# Patient Record
Sex: FEMALE | Employment: OTHER | URBAN - METROPOLITAN AREA
[De-identification: names, ages, dates, MRNs, and addresses within clinical notes are randomized per-mention and may not be internally consistent; named-entity substitution may affect disease eponyms.]

---

## 2017-05-22 ENCOUNTER — TRANSFERRED RECORDS (OUTPATIENT)
Dept: HEALTH INFORMATION MANAGEMENT | Facility: CLINIC | Age: 67
End: 2017-05-22

## 2017-08-15 ENCOUNTER — TELEPHONE (OUTPATIENT)
Dept: GASTROENTEROLOGY | Facility: CLINIC | Age: 67
End: 2017-08-15

## 2017-08-15 NOTE — TELEPHONE ENCOUNTER
Disc taken to be uploaded (CT abd pel)  Final read scanned into [facility].      08/15/2017  318p

## 2017-09-11 ENCOUNTER — TELEPHONE (OUTPATIENT)
Dept: GASTROENTEROLOGY | Facility: CLINIC | Age: 67
End: 2017-09-11

## 2017-09-11 NOTE — TELEPHONE ENCOUNTER
The call center is sending messages to call this patient with updates on her referral.    I called this patient and I don't believe her referral is to our clinic.   A CT was sent to our clinic and I did take it to be scanned in but this patient is looking to have excess skin removed and also she needs a hernia procedure.   I asked her to contact her PCP and have them initiate a referral if we are indeed the right clinic.    SR 09/11/2017  244p

## 2017-09-26 ENCOUNTER — CARE COORDINATION (OUTPATIENT)
Dept: SURGERY | Facility: CLINIC | Age: 67
End: 2017-09-26

## 2017-09-26 NOTE — PROGRESS NOTES
Pre Visit Call and Assessment    Date of call:  09/26/2017    Phone numbers:  Home number on file [phone] (home)    Reached patient/confirmed appointment:  Yes  Patient care team/Primary provider:  Rafia Vogel  Preferred outpatient pharmacy:  No Pharmacies Listed  Referred to:  Dr. Jayy Bell    Reason for visit:  Hernia consult

## 2017-11-09 ENCOUNTER — TELEPHONE (OUTPATIENT)
Dept: SURGERY | Facility: CLINIC | Age: 67
End: 2017-11-09

## 2017-11-09 NOTE — TELEPHONE ENCOUNTER
Per task, I called Simin to reschedule her appointment. I confirmed time, date, location and provider with her. I also offered to reschedule her plastic surgery appointment and she declined. She feels that she needs to see Dr. Chaves more than she needs to see Dr. Bell. She did not want my direct number since I took care of everything for her.

## 2017-11-09 NOTE — TELEPHONE ENCOUNTER
----- Message from Azeb Lr RN sent at 11/9/2017 10:41 AM CST -----  Regarding: [facility] patient  Dr. Ray Hernandez will not be in clinic this day.  Can you reschedule the patients appointment.    1/3/18     Azeb

## 2018-02-20 ENCOUNTER — TELEPHONE (OUTPATIENT)
Dept: SURGERY | Facility: CLINIC | Age: 68
End: 2018-02-20

## 2018-02-20 NOTE — TELEPHONE ENCOUNTER
Pre Visit Call and Assessment    Date of call:  02/20/2018    Phone numbers:  Home number on file [phone] (home)    Reached patient/confirmed appointment:  Yes  Patient care team/Primary provider:  Rafia Vogel  Preferred outpatient pharmacy:  No Pharmacies Listed  Referred to:  Dr. Jayy Bell    Reason for visit: New hernia consult  Other: Confirmed plastic surgery consult appointment with patient.

## 2018-02-21 ENCOUNTER — OFFICE VISIT (OUTPATIENT)
Dept: PLASTIC SURGERY | Facility: CLINIC | Age: 68
End: 2018-02-21
Payer: MEDICARE

## 2018-02-21 ENCOUNTER — OFFICE VISIT (OUTPATIENT)
Dept: SURGERY | Facility: CLINIC | Age: 68
End: 2018-02-21
Payer: MEDICARE

## 2018-02-21 VITALS
DIASTOLIC BLOOD PRESSURE: 96 MMHG | TEMPERATURE: 98.3 F | WEIGHT: 277.7 LBS | OXYGEN SATURATION: 96 % | SYSTOLIC BLOOD PRESSURE: 215 MMHG | HEART RATE: 83 BPM | HEIGHT: 55 IN | BODY MASS INDEX: 64.27 KG/M2

## 2018-02-21 DIAGNOSIS — K43.9 VENTRAL HERNIA WITHOUT OBSTRUCTION OR GANGRENE: Primary | ICD-10-CM

## 2018-02-21 DIAGNOSIS — M79.3 PANNICULITIS: Primary | ICD-10-CM

## 2018-02-21 RX ORDER — HYDRALAZINE HYDROCHLORIDE 50 MG/1
50 TABLET, FILM COATED ORAL 2 TIMES DAILY
Status: ON HOLD | COMMUNITY
End: 2018-09-28

## 2018-02-21 RX ORDER — PNV NO.95/FERROUS FUM/FOLIC AC 28MG-0.8MG
325 TABLET ORAL
Status: ON HOLD | COMMUNITY
End: 2018-09-28

## 2018-02-21 RX ORDER — GLIPIZIDE 10 MG/1
10 TABLET, FILM COATED, EXTENDED RELEASE ORAL
Status: ON HOLD | COMMUNITY
End: 2018-09-28

## 2018-02-21 RX ORDER — SIMVASTATIN 80 MG
80 TABLET ORAL AT BEDTIME
Status: ON HOLD | COMMUNITY
End: 2018-09-28

## 2018-02-21 RX ORDER — WARFARIN SODIUM 2.5 MG/1
2.5 TABLET ORAL
COMMUNITY
End: 2018-09-11

## 2018-02-21 RX ORDER — LORATADINE 10 MG/1
10 TABLET ORAL DAILY
Status: ON HOLD | COMMUNITY
End: 2018-09-28

## 2018-02-21 RX ORDER — CARVEDILOL 12.5 MG/1
12.5 TABLET ORAL 2 TIMES DAILY WITH MEALS
Status: ON HOLD | COMMUNITY
End: 2018-09-28

## 2018-02-21 RX ORDER — ACETAMINOPHEN 500 MG
500 TABLET ORAL
COMMUNITY
Start: 2017-04-12 | End: 2018-09-11

## 2018-02-21 RX ORDER — PIOGLITAZONEHYDROCHLORIDE 45 MG/1
45 TABLET ORAL DAILY
Status: ON HOLD | COMMUNITY
End: 2018-09-28

## 2018-02-21 RX ORDER — ISOSORBIDE MONONITRATE 30 MG/1
30 TABLET, EXTENDED RELEASE ORAL DAILY
Status: ON HOLD | COMMUNITY
End: 2018-09-28

## 2018-02-21 RX ORDER — FUROSEMIDE 20 MG
20 TABLET ORAL DAILY
Status: ON HOLD | COMMUNITY
End: 2018-09-28

## 2018-02-21 ASSESSMENT — PAIN SCALES - GENERAL: PAINLEVEL: MILD PAIN (3)

## 2018-02-21 NOTE — NURSING NOTE
Chief Complaint   Patient presents with     Clinic Care Coordination - Initial     New pt consult, hernia.        Vitals:    02/21/18 0928   BP: (!) 215/96   BP Location: Left arm   Patient Position: Chair   Cuff Size: Adult Large   Pulse: 83   Temp: 98.3  F (36.8  C)   TempSrc: Oral   SpO2: 96%   Weight: 277 lb 11.2 oz   Height: 4'       Body mass index is 84.74 kg/(m^2).  Renaldo ASHRAF LPN

## 2018-02-21 NOTE — PATIENT INSTRUCTIONS
1.  Contact primary doctor to get referral to cardiac and whatever other work-up that may be needed for clearance for surgery.  We will need written clearance faxed to Dr. Chaves at [phone].    2.  Speak to brother about risk of death, heart attack, stroke and delayed wound problems for 6 months to a year.  You will need wound care, most likely in a nursing home for several months.    3.  Contact our office once you have received your clearance from a cardiologist in addition to whomever Dr. Vogel feels you need to see for surgical clearance.  Jenelle RN, phone [phone].

## 2018-02-21 NOTE — PATIENT INSTRUCTIONS
Return to the Surgery Clinic on an as needed basis.  Please call [phone], option #3, with questions.

## 2018-02-21 NOTE — MR AVS SNAPSHOT
After Visit Summary   2018    Simin Huddleston    MRN: [id number]           Patient Information     Date Of Birth          1950        Visit Information        Provider Department      2018 10:00 AM AGUSTO Chaves MD [facility] Plastic and Reconstructive Surgery        Care Instructions    1.  Contact primary doctor to get referral to cardiac and whatever other work-up that may be needed for clearance for surgery.  We will need written clearance faxed to Dr. Chaves at [phone].    2.  Speak to brother about risk of death, heart attack, stroke and delayed wound problems for 6 months to a year.  You will need wound care, most likely in a nursing home for several months.    3.  Contact our office once you have received your clearance from a cardiologist in addition to whomever Dr. Vogel feels you need to see for surgical clearance.  ESTEPHANIA Almanzar, phone [phone].          Follow-ups after your visit        Who to contact     Please call your clinic at [phone] to:    Ask questions about your health    Make or cancel appointments    Discuss your medicines    Learn about your test results    Speak to your doctor            Additional Information About Your Visit        MyChart Information     ClearDATAt is an electronic gateway that provides easy, online access to your medical records. With [employer], you can request a clinic appointment, read your test results, renew a prescription or communicate with your care team.     To sign up for Clear[employer]At visit the website at [email or web address]   You will be asked to enter the access code listed below, as well as some personal information. Please follow the directions to create your username and password.     Your access code is: [email or web address]  Expires: 3/20/2018  6:30 AM     Your access code will  in 90 days. If you need help or a new code, please contact your [facility] or call [phone] for  assistance.        Care EveryWhere ID     This is your Care EveryWhere ID. This could be used by other organizations to access your [place] medical records  [id number]         Blood Pressure from Last 3 Encounters:   02/21/18 (!) 215/96    Weight from Last 3 Encounters:   02/21/18 126 kg (277 lb 11.2 oz)              Today, you had the following     No orders found for display       Primary Care Provider Office Phone # Fax #    Rafia Vogel [phone]       [address] [address]        Equal Access to Services     [facility]: Hadii aad ku hadasho Soomaali, waaxda luqadaha, qaybta kaalmada adeegyada, donato patton . So [facility] [phone].    ATENCIÓN: Si habla español, tiene a romano disposición servicios gratuitos de asistencia lingüística. [facility] [phone].    We comply with applicable federal civil rights laws and [place] laws. We do not discriminate on the basis of race, color, national origin, age, disability, sex, sexual orientation, or gender identity.            Thank you!     Thank you for choosing [facility] PLASTIC AND RECONSTRUCTIVE SURGERY  for your care. Our goal is always to provide you with excellent care. Hearing back from our patients is one way we can continue to improve our services. Please take a few minutes to complete the written survey that you may receive in the mail after your visit with us. Thank you!             Your Updated Medication List - Protect others around you: Learn how to safely use, store and throw away your medicines at www.disposemymeds.org.          This list is accurate as of 2/21/18 10:29 AM.  Always use your most recent med list.                   Brand Name Dispense Instructions for use Diagnosis    acetaminophen 500 MG tablet    TYLENOL     Take 500 mg by mouth        aspirin 81 MG tablet      Take 81 mg by mouth        carvedilol 12.5 MG tablet    COREG     Take 12.5 mg by mouth         furosemide 20 MG tablet    LASIX     Take 20 mg by mouth        glipiZIDE 10 MG 24 hr tablet    GLUCOTROL XL     Take 10 mg by mouth        hydrALAZINE 50 MG tablet    APRESOLINE     Take 50 mg by mouth        iron 325 (65 FE) MG tablet      Take 325 mg by mouth        isosorbide mononitrate 30 MG 24 hr tablet    IMDUR     Take 30 mg by mouth        loratadine 10 MG tablet    CLARITIN     Take 10 mg by mouth daily        pioglitazone 45 MG tablet    ACTOS     Take 45 mg by mouth        simvastatin 80 MG tablet    ZOCOR     Take 80 mg by mouth        warfarin 2.5 MG tablet    COUMADIN     Take 2.5 mg by mouth

## 2018-02-21 NOTE — LETTER
2/21/2018       RE: Simin Huddleston  [address]    [facility] [address]     Dear Colleague,    Thank you for referring your patient, Simin Huddleston, to the [facility] PLASTIC AND RECONSTRUCTIVE SURGERY at [facility]. Please see a copy of my visit note below.    REFERRING PHYSICIAN:  Rafia Vogel MD      PRESENTING COMPLAINT:  Consultation for massive abdominal panniculus.      HISTORY OF PRESENT ILLNESS:  Ms. Huddleston is 67 years old.  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  It is a full of fluid, basically it has significant lymphedema within it.  It is causing immense issues with her day-to-day life including ambulation, clothing, toileting and basically every facet of life.  She also has a lot of intertrigo.  She is here to discuss options for removing it.      PAST MEDICAL HISTORY:  Diabetes, hypertension, coronary artery disease, DVTs, osteoporosis, arthritis.      PAST SURGICAL HISTORY:  Cardiac stenting, right nephrectomy, cholecystectomy, tubal ligation, hemorrhoidectomy, and multiple hernia surgeries with mesh; last surgery done decades ago.      MEDICATIONS:  Aspirin, isosorbide mononitrate, hydralazine, furosemide, simvastatin, Glipizide, Coumadin, loratadine, carvedilol and pioglitazone.      ALLERGIES:  Aspirin, metformin, latex, adhesive tape, multiple food allergies.      SOCIAL HISTORY:  She used to smoke, quit many years ago.  Does not drink alcohol.      REVIEW OF SYSTEMS:  She has some chest pain now and again and shortness of breath now and again.  History of a DVT.  No history of PE.  No history of an MI or CVA although she has had coronary artery disease and stenting.      PHYSICAL EXAMINATION:  Vital signs stable.  She is afebrile, in no obvious distress.  She is 4 feet, 277 pounds with a BMI of 85 kg/m2.  On examination of her abdomen, she has a large abdominal panniculus that hangs down below her  knees.  She has lymphedema within the apex of this panniculus.  The umbilicus is above the panniculus.  I cannot feel an obvious hernia.      ASSESSMENT AND PLAN:  Based upon the above findings, a diagnosis of a lower abdominal panniculus was made.  I had a long discussion with the patient about the findings.  She met with Dr. Bell of General Surgery today who does not feel that she has an abdominal wall hernia that needs fixing.  The patient would like the panniculus dealt with.  I explained to the patient that most likely she would benefit from a panniculectomy which will decrease the size of the panniculus, but not eliminate it.  However, this is a major undertaking given her significant medical comorbidities and the size of her panniculus.  This is going to lead to an obvious large abdominal wound that is going to take months to heal in.  She will have wound problems post-surgery and most likely we will leave part of the wound open and let it heal in secondarily given the 100% likelihood of infection.  I explained to the patient that she is a high risk for systemic complications given her history of coronary artery disease, active chest pain, shortness of breath, diabetes and her history of DVTs.  Going forward, I would only be comfortable proceeding with this surgery if she gets a 100% clearance from her medical doctors including a general physical, a cardiac workup and a written clearance from a medical standpoint.  Additionally, she needs to understand that despite the clearance, she is at a high risk for systemic complications like cardiac and pulmonary and thromboembolic complications.  All other risks of pain, infection, bleeding, scarring, asymmetry, seromas, hematomas, wound breakdown, wound dehiscence, wound issues, chronic wound problems, requirement of staged procedures, injury to deeper structures like nerves, vessels and bowel, DVT, PE, MI, CVA, pneumonia, renal failure and death were explained.   She understood everything.  She wants to think about it.  She will discuss her case with her family as well as her medical doctors as described above.  She needs to be off of Coumadin for us to do this, but we will place her on perioperative Lovenox but it does also increase her risk for bleeding.  All questions were answered.  All of the above was discussed in the presence of my nurse.  She will get the above steps taken and then see me back in clinic.      Total time spent with patient was 30 minutes, more than half was counseling.     Again, thank you for allowing me to participate in the care of your patient.      Sincerely,    AGUSTO Chaves MD    cc:   Rafia Vogel MD   [id number] [facility]   [place]  [address]

## 2018-02-21 NOTE — PROGRESS NOTES
REFERRING PHYSICIAN:  Rafia Vogel MD      PRESENTING COMPLAINT:  Consultation for massive abdominal panniculus.      HISTORY OF PRESENT ILLNESS:  Ms. Huddleston is 67 years old.  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  It is a full of fluid, basically it has significant lymphedema within it.  It is causing immense issues with her day-to-day life including ambulation, clothing, toileting and basically every facet of life.  She also has a lot of intertrigo.  She is here to discuss options for removing it.      PAST MEDICAL HISTORY:  Diabetes, hypertension, coronary artery disease, DVTs, osteoporosis, arthritis.      PAST SURGICAL HISTORY:  Cardiac stenting, right nephrectomy, cholecystectomy, tubal ligation, hemorrhoidectomy, and multiple hernia surgeries with mesh; last surgery done decades ago.      MEDICATIONS:  Aspirin, isosorbide mononitrate, hydralazine, furosemide, simvastatin, Glipizide, Coumadin, loratadine, carvedilol and pioglitazone.      ALLERGIES:  Aspirin, metformin, latex, adhesive tape, multiple food allergies.      SOCIAL HISTORY:  She used to smoke, quit many years ago.  Does not drink alcohol.      REVIEW OF SYSTEMS:  She has some chest pain now and again and shortness of breath now and again.  History of a DVT.  No history of PE.  No history of an MI or CVA although she has had coronary artery disease and stenting.      PHYSICAL EXAMINATION:  Vital signs stable.  She is afebrile, in no obvious distress.  She is 4 feet, 277 pounds with a BMI of 85 kg/m2.  On examination of her abdomen, she has a large abdominal panniculus that hangs down below her knees.  She has lymphedema within the apex of this panniculus.  The umbilicus is above the panniculus.  I cannot feel an obvious hernia.      ASSESSMENT AND PLAN:  Based upon the above findings, a diagnosis of a lower abdominal panniculus was made.  I had a long discussion with the patient about the  findings.  She met with Dr. Bell of General Surgery today who does not feel that she has an abdominal wall hernia that needs fixing.  The patient would like the panniculus dealt with.  I explained to the patient that most likely she would benefit from a panniculectomy which will decrease the size of the panniculus, but not eliminate it.  However, this is a major undertaking given her significant medical comorbidities and the size of her panniculus.  This is going to lead to an obvious large abdominal wound that is going to take months to heal in.  She will have wound problems post-surgery and most likely we will leave part of the wound open and let it heal in secondarily given the 100% likelihood of infection.  I explained to the patient that she is a high risk for systemic complications given her history of coronary artery disease, active chest pain, shortness of breath, diabetes and her history of DVTs.  Going forward, I would only be comfortable proceeding with this surgery if she gets a 100% clearance from her medical doctors including a general physical, a cardiac workup and a written clearance from a medical standpoint.  Additionally, she needs to understand that despite the clearance, she is at a high risk for systemic complications like cardiac and pulmonary and thromboembolic complications.  All other risks of pain, infection, bleeding, scarring, asymmetry, seromas, hematomas, wound breakdown, wound dehiscence, wound issues, chronic wound problems, requirement of staged procedures, injury to deeper structures like nerves, vessels and bowel, DVT, PE, MI, CVA, pneumonia, renal failure and death were explained.  She understood everything.  She wants to think about it.  She will discuss her case with her family as well as her medical doctors as described above.  She needs to be off of Coumadin for us to do this, but we will place her on perioperative Lovenox but it does also increase her risk for bleeding.   All questions were answered.  All of the above was discussed in the presence of my nurse.  She will get the above steps taken and then see me back in clinic.      Total time spent with patient was 30 minutes, more than half was counseling.      cc:   Rafia Vogel MD   [address]   [place]  [address]

## 2018-02-21 NOTE — LETTER
2/21/2018       RE: Simin Huddleston  [address]    [place] [facility] [address]     Dear Colleague,    Thank you for referring your patient, Simin Huddleston, to the [facility] GENERAL SURGERY at [facility]. Please see a copy of my visit note below.    Simin Huddleston is sent to me re possibility of hernia surgery. She denies any symptoms of hernia. She is more concerned about her ucoming evaluation for possible panniculectomy.   She is status post multiple hernia surgeries, including hernioplasty for recurrence.  Past medical and surgical history completely reviewed and noted in chart. Has a multitude of medical issues.  Patient Active Problem List   Diagnosis     Panniculitis   '  Current Outpatient Prescriptions   Medication     aspirin 81 MG tablet     isosorbide mononitrate (IMDUR) 30 MG 24 hr tablet     hydrALAZINE (APRESOLINE) 50 MG tablet     Ferrous Sulfate (IRON) 325 (65 FE) MG tablet     furosemide (LASIX) 20 MG tablet     pioglitazone (ACTOS) 45 MG tablet     carvedilol (COREG) 12.5 MG tablet     warfarin (COUMADIN) 2.5 MG tablet     glipiZIDE (GLUCOTROL XL) 10 MG 24 hr tablet     simvastatin (ZOCOR) 80 MG tablet     acetaminophen (TYLENOL) 500 MG tablet     loratadine (CLARITIN) 10 MG tablet     No current facility-administered medications for this visit.      PHYSICAL EXAM  General appearance- alert, and in no distress. Short but today's measurements of height and weight may be off.  Her weight is taken as 277 lbs  Neck- Neck is supple with no obvious adenopathy.  Lungs- Respiratory effort unlabored.  Gait- limited.  Abdomen - well healed surgical scars of the midline without fascial defects appreciated. Large panniculus.    No studies available for my review.    Impression: Obese woman without evidence of hernia recurrence on today's exam. Would need Ct to fully eval, but will not order as I would not recommend any surgery to repair recurrence in this patient with  multiple contraindications to hernia repair. Risks far out weight benefits.  Recommendation: continued medical cares per her PCP. Discussed this at length with the patient who understood the rationale for this recommendation.  The total time spent with this patient was 30 minutes.  Of this time, greater than 50% was spent counseling and coordinating care.              Again, thank you for allowing me to participate in the care of your patient.      Sincerely,    Jayy Bell MD

## 2018-02-22 NOTE — PROGRESS NOTES
Simin Huddleston is sent to me re possibility of hernia surgery. She denies any symptoms of hernia. She is more concerned about her ucoming evaluation for possible panniculectomy.   She is status post multiple hernia surgeries, including hernioplasty for recurrence.  Past medical and surgical history completely reviewed and noted in chart. Has a multitude of medical issues.  Patient Active Problem List   Diagnosis     Panniculitis   '  Current Outpatient Prescriptions   Medication     aspirin 81 MG tablet     isosorbide mononitrate (IMDUR) 30 MG 24 hr tablet     hydrALAZINE (APRESOLINE) 50 MG tablet     Ferrous Sulfate (IRON) 325 (65 FE) MG tablet     furosemide (LASIX) 20 MG tablet     pioglitazone (ACTOS) 45 MG tablet     carvedilol (COREG) 12.5 MG tablet     warfarin (COUMADIN) 2.5 MG tablet     glipiZIDE (GLUCOTROL XL) 10 MG 24 hr tablet     simvastatin (ZOCOR) 80 MG tablet     acetaminophen (TYLENOL) 500 MG tablet     loratadine (CLARITIN) 10 MG tablet     No current facility-administered medications for this visit.      PHYSICAL EXAM  General appearance- alert, and in no distress. Short but today's measurements of height and weight may be off.  Her weight is taken as 277 lbs  Neck- Neck is supple with no obvious adenopathy.  Lungs- Respiratory effort unlabored.  Gait- limited.  Abdomen - well healed surgical scars of the midline without fascial defects appreciated. Large panniculus.    No studies available for my review.    Impression: Obese woman without evidence of hernia recurrence on today's exam. Would need Ct to fully eval, but will not order as I would not recommend any surgery to repair recurrence in this patient with multiple contraindications to hernia repair. Risks far out weight benefits.  Recommendation: continued medical cares per her PCP. Discussed this at length with the patient who understood the rationale for this recommendation.  The total time spent with this patient was 30 minutes.  Of this  time, greater than 50% was spent counseling and coordinating care.

## 2018-04-27 ENCOUNTER — TRANSFERRED RECORDS (OUTPATIENT)
Dept: HEALTH INFORMATION MANAGEMENT | Facility: CLINIC | Age: 68
End: 2018-04-27

## 2018-05-01 ENCOUNTER — TRANSFERRED RECORDS (OUTPATIENT)
Dept: HEALTH INFORMATION MANAGEMENT | Facility: CLINIC | Age: 68
End: 2018-05-01

## 2018-05-13 ENCOUNTER — MEDICAL CORRESPONDENCE (OUTPATIENT)
Dept: HEALTH INFORMATION MANAGEMENT | Facility: CLINIC | Age: 68
End: 2018-05-13

## 2018-05-25 ENCOUNTER — CARE COORDINATION (OUTPATIENT)
Dept: PLASTIC SURGERY | Facility: CLINIC | Age: 68
End: 2018-05-25

## 2018-05-25 NOTE — PROGRESS NOTES
Contact primary doctor to get referral to cardiac and whatever other work-up that may be needed for clearance for surgery.  We will need written clearance faxed to Dr. Chaves at [phone].     2.  Speak to brother about risk of death, heart attack, stroke and delayed wound problems for 6 months to a year.  You will need wound care, most likely in a nursing home for several months.     3.  Contact our office once you have received your clearance from a cardiologist in addition to whomever Dr. Vogel feels you need to see for surgical clearance.  ESTEPHANIA Almanzar, phone [phone].      Electronically signed by Rafia Mcdaniel RN at 2/21/2018 10:29 AM     Above recommendations were given to patient at Dr. Chaves's consult in February, 2018.  Stress test scanned into chart.  Contacted Dr. Vogel's office (PCP) to provide written clearance for patient's surgery.

## 2018-05-31 ENCOUNTER — TELEPHONE (OUTPATIENT)
Dept: PLASTIC SURGERY | Facility: CLINIC | Age: 68
End: 2018-05-31

## 2018-05-31 NOTE — TELEPHONE ENCOUNTER
[facility] Call Center    Phone Message    May a detailed message be left on voicemail: yes    Reason for Call: Other: Barbi/ZAHRA from [facility] in [place] from Dr Vogel's office, Ph # [phone]. Needs call back please. Said Pt was cleared by Cardiologist at [facility] to have Surgery here by Dr Chaves. But then Dr Chaves sent them a letter that he needed Pt have clearance again for Surgery. She needs to know if they can just do a Pre-Op Physical for this Pt and send that over? or if Dr Chaves is asking for something more to be done for clearance for Surgery for this Pt? Thanks!     Action Taken: Message routed to:  [facility] ([facility] Plastic Surgery

## 2018-05-31 NOTE — TELEPHONE ENCOUNTER
Spoke to Barbi SWEENEY over at [facility] with Dr. Vogel's office, they sent over the approval/ clearance from cardiologist and they are going to do a pre op H & P with Dr. Vogel's office and have that faxed over to us.

## 2018-07-06 ENCOUNTER — TRANSFERRED RECORDS (OUTPATIENT)
Dept: HEALTH INFORMATION MANAGEMENT | Facility: CLINIC | Age: 68
End: 2018-07-06

## 2018-07-13 ENCOUNTER — TELEPHONE (OUTPATIENT)
Dept: SURGERY | Facility: CLINIC | Age: 68
End: 2018-07-13

## 2018-07-19 ENCOUNTER — CARE COORDINATION (OUTPATIENT)
Dept: PLASTIC SURGERY | Facility: CLINIC | Age: 68
End: 2018-07-19

## 2018-07-19 NOTE — PROGRESS NOTES
Received a message from Dr. Vogel's office that Simin was treated for rashes at [facility] in [place].  I called pt and left message to call back--that we needed documentation of her treatment for rashes before proceeding.

## 2018-07-30 ENCOUNTER — TELEPHONE (OUTPATIENT)
Dept: PLASTIC SURGERY | Facility: CLINIC | Age: 68
End: 2018-07-30

## 2018-07-30 NOTE — TELEPHONE ENCOUNTER
[facility] Call Center    Phone Message    May a detailed message be left on voicemail: yes    Reason for Call: Evelyn with [facility] called asking if the appointment on 8/1 can be a skype appointment or even a telephone appointment please call her back at [phone] and ask for the nurses station     Action Taken: Message routed to:  [facility] ([facility] Plastic surgery

## 2018-07-31 NOTE — TELEPHONE ENCOUNTER
Returned call to [facility] at [phone] (Fax [phone]) Spoke with ESTEPHANIA Somers.  I explained to Lizbet that we were unaware that Simin was in a rehab facility and that I had left a message for Simin at her home that we needed further documentation of treatment of her panniculitis.  She has been in the rehab facility since June, 2018 due to the panniculitis.  She will send documentation of treatment for this for the pt's prior authoriziation.  Appt for tomorrow was cancelled until PA approved for surgery.  Pt has obtained medical clearances as requested by Dr. Chaves.  I informed Lizbet that we would contact them after documentation was received and the PA was approved.

## 2018-08-08 ENCOUNTER — TRANSFERRED RECORDS (OUTPATIENT)
Dept: HEALTH INFORMATION MANAGEMENT | Facility: CLINIC | Age: 68
End: 2018-08-08

## 2018-08-08 ENCOUNTER — CARE COORDINATION (OUTPATIENT)
Dept: PLASTIC SURGERY | Facility: CLINIC | Age: 68
End: 2018-08-08

## 2018-08-08 DIAGNOSIS — M79.3 PANNICULITIS: Primary | ICD-10-CM

## 2018-08-08 RX ORDER — CEFAZOLIN SODIUM 1 G/50ML
1 INJECTION, SOLUTION INTRAVENOUS SEE ADMIN INSTRUCTIONS
Status: CANCELLED | OUTPATIENT
Start: 2018-08-08 | End: 2019-08-08

## 2018-08-08 RX ORDER — CEFAZOLIN SODIUM 1 G/50ML
3 SOLUTION INTRAVENOUS
Status: CANCELLED | OUTPATIENT
Start: 2018-08-08 | End: 2038-08-09

## 2018-08-08 NOTE — PROGRESS NOTES
Dr. Chaves received panniculus photos and reviewed cardiology and PCP note regarding readiness for panniculectomy.  States will put in orders for pt and send message to scheduling. Mandatory to have a pre-op appt with him.  Alber Cervantes notified that surgery will be scheduled.

## 2018-08-13 ENCOUNTER — TRANSFERRED RECORDS (OUTPATIENT)
Dept: HEALTH INFORMATION MANAGEMENT | Facility: CLINIC | Age: 68
End: 2018-08-13

## 2018-08-28 ENCOUNTER — TELEPHONE (OUTPATIENT)
Dept: PLASTIC SURGERY | Facility: CLINIC | Age: 68
End: 2018-08-28

## 2018-08-28 NOTE — TELEPHONE ENCOUNTER
[facility] Call Center    Phone Message    May a detailed message be left on voicemail: yes    Reason for Call: Other: Michael from [employer] calling with several questions about pts surgery i.e. where it will be done, what is the recovery time, etc.     Action Taken: Message routed to:  [facility] ([facility]

## 2018-08-29 NOTE — TELEPHONE ENCOUNTER
Spoke with Michael who had several questions about surgery and recovery. Discussed need for prolonged wound healing and wound care in TCU.  She provides Simin's transportation, we may call her with issues.

## 2018-09-11 ENCOUNTER — OFFICE VISIT (OUTPATIENT)
Dept: SURGERY | Facility: CLINIC | Age: 68
End: 2018-09-11
Payer: MEDICARE

## 2018-09-11 ENCOUNTER — ANESTHESIA EVENT (OUTPATIENT)
Dept: SURGERY | Facility: CLINIC | Age: 68
End: 2018-09-11
Payer: MEDICARE

## 2018-09-11 VITALS
OXYGEN SATURATION: 97 % | SYSTOLIC BLOOD PRESSURE: 168 MMHG | HEART RATE: 65 BPM | TEMPERATURE: 97.8 F | DIASTOLIC BLOOD PRESSURE: 69 MMHG

## 2018-09-11 DIAGNOSIS — M79.3 PANNICULITIS: ICD-10-CM

## 2018-09-11 DIAGNOSIS — Z01.818 PREOP EXAMINATION: ICD-10-CM

## 2018-09-11 DIAGNOSIS — Z01.818 PREOP EXAMINATION: Primary | ICD-10-CM

## 2018-09-11 LAB
ANION GAP SERPL CALCULATED.3IONS-SCNC: 6 MMOL/L (ref 3–14)
BUN SERPL-MCNC: 52 MG/DL (ref 7–30)
CALCIUM SERPL-MCNC: 9.2 MG/DL (ref 8.5–10.1)
CHLORIDE SERPL-SCNC: 115 MMOL/L (ref 94–109)
CO2 SERPL-SCNC: 20 MMOL/L (ref 20–32)
CREAT SERPL-MCNC: 1.7 MG/DL (ref 0.52–1.04)
ERYTHROCYTE [DISTWIDTH] IN BLOOD BY AUTOMATED COUNT: 17.4 % (ref 10–15)
GFR SERPL CREATININE-BSD FRML MDRD: 30 ML/MIN/1.7M2
GLUCOSE SERPL-MCNC: 46 MG/DL (ref 70–99)
HBA1C MFR BLD: 5.7 % (ref 0–5.6)
HCT VFR BLD AUTO: 36.1 % (ref 35–47)
HGB BLD-MCNC: 10.8 G/DL (ref 11.7–15.7)
MCH RBC QN AUTO: 29 PG (ref 26.5–33)
MCHC RBC AUTO-ENTMCNC: 29.9 G/DL (ref 31.5–36.5)
MCV RBC AUTO: 97 FL (ref 78–100)
PLATELET # BLD AUTO: 162 10E9/L (ref 150–450)
POTASSIUM SERPL-SCNC: 6.3 MMOL/L (ref 3.4–5.3)
RBC # BLD AUTO: 3.73 10E12/L (ref 3.8–5.2)
SODIUM SERPL-SCNC: 141 MMOL/L (ref 133–144)
WBC # BLD AUTO: 5.8 10E9/L (ref 4–11)

## 2018-09-11 PROCEDURE — 86923 COMPATIBILITY TEST ELECTRIC: CPT | Performed by: NURSE PRACTITIONER

## 2018-09-11 RX ORDER — BISACODYL 10 MG
10 SUPPOSITORY, RECTAL RECTAL DAILY PRN
COMMUNITY

## 2018-09-11 RX ORDER — SPIRONOLACTONE 25 MG/1
25 TABLET ORAL DAILY
Status: ON HOLD | COMMUNITY
End: 2018-09-28

## 2018-09-11 RX ORDER — WARFARIN SODIUM 4 MG/1
4 TABLET ORAL EVERY EVENING
Status: ON HOLD | COMMUNITY
End: 2018-09-28

## 2018-09-11 RX ORDER — NITROGLYCERIN 0.4 MG/1
0.4 TABLET SUBLINGUAL EVERY 5 MIN PRN
COMMUNITY

## 2018-09-11 RX ORDER — ACETAMINOPHEN 325 MG/1
650 TABLET ORAL 2 TIMES DAILY
Status: ON HOLD | COMMUNITY
End: 2018-09-28

## 2018-09-11 RX ORDER — CARBOXYMETHYLCELLULOSE SODIUM 5 MG/ML
1 SOLUTION/ DROPS OPHTHALMIC 4 TIMES DAILY PRN
COMMUNITY

## 2018-09-11 RX ORDER — SODIUM CHLORIDE 5 %
1 OINTMENT (GRAM) OPHTHALMIC (EYE) AT BEDTIME
COMMUNITY

## 2018-09-11 ASSESSMENT — LIFESTYLE VARIABLES: TOBACCO_USE: 1

## 2018-09-11 NOTE — H&P
Pre-Operative H & P     CC:  Preoperative exam to assess for increased cardiopulmonary risk while undergoing surgery and anesthesia.    Date of Encounter: 9/11/2018  Primary Care Physician:  Rafia Vogel  Associated diagnosis:  panniculitis    HPI  Simin Huddleston is a 68 year old female who presents for pre-operative H & P in preparation for a Massive Panniculectomy with Dr. Chaves on 9/27/2018 at [facility].     Simin Huddleston is a 68 year old female with ischemic cardiomyopathy, systolic CHF, hypertension, hyperlipidemia, CAD, history of smoking, allergic rhinitis, chronic lower extremity DVT, diabetes and CKD that has an extremely large pannus.  She has consulted with Dr. Chaves to request a panniculectomy surgery.  She has had recurrent panniculitis, the large pannus has significantly impaired her quality of life and she has BLE vascular changes likely somewhat related to the pannus.  She has elected to proceed with the panniculectomy as scheduled above.      History is obtained from the patient.     Past Medical History  Past Medical History:   Diagnosis Date     Acquired solitary kidney      Allergic rhinitis      CAD (coronary artery disease)      Chronic deep vein thrombosis (DVT) (H)      CKD (chronic kidney disease)      Diabetes (H)      Hx of smoking      Hyperlipidemia      Ischemic cardiomyopathy      Morbid obesity (H)      Panniculitis      Systolic CHF (H)        Past Surgical History  Past Surgical History:   Procedure Laterality Date     CHOLECYSTECTOMY       coronary stents      x 3     HERNIA REPAIR       NEPHRECTOMY Right     removed due to infection     TUBAL LIGATION         Hx of Blood transfusions/reactions: none    Hx of abnormal bleeding or anti-platelet use: aspirin    Menstrual history: postmenopausal    Steroid use in the last year: none    Personal or FH with difficulty with Anesthesia:  none    Prior to Admission Medications  Current  Outpatient Prescriptions   Medication Sig Dispense Refill     acetaminophen (TYLENOL) 325 MG tablet Take 650 mg by mouth 2 times daily       aspirin 81 MG tablet Take 81 mg by mouth daily        bisacodyl (DULCOLAX) 10 MG Suppository Place 10 mg rectally daily as needed for constipation       Carboxymethylcellulose Sod PF (REFRESH PLUS) 0.5 % SOLN ophthalmic solution Place 1 drop into both eyes 4 times daily as needed for dry eyes       carvedilol (COREG) 12.5 MG tablet Take 12.5 mg by mouth 2 times daily (with meals)        diclofenac (VOLTAREN) 1 % GEL topical gel Place 4 g onto the skin 4 times daily       diphenhydrAMINE-acetaminophen (TYLENOL PM)  MG tablet Take 1 tablet by mouth nightly as needed for sleep       Ferrous Sulfate (IRON) 325 (65 FE) MG tablet Take 325 mg by mouth daily (with breakfast)        furosemide (LASIX) 20 MG tablet Take 20 mg by mouth daily        glipiZIDE (GLUCOTROL XL) 10 MG 24 hr tablet Take 10 mg by mouth daily (with breakfast)        hydrALAZINE (APRESOLINE) 50 MG tablet Take 50 mg by mouth 2 times daily        isosorbide mononitrate (IMDUR) 30 MG 24 hr tablet Take 30 mg by mouth daily        loratadine (CLARITIN) 10 MG tablet Take 10 mg by mouth daily       magnesium hydroxide (MILK OF MAGNESIA) 400 MG/5ML suspension Take 30 mLs by mouth daily as needed for constipation       nitroGLYcerin (NITROSTAT) 0.4 MG sublingual tablet Place 0.4 mg under the tongue every 5 minutes as needed for chest pain For chest pain place 1 tablet under the tongue every 5 minutes for 3 doses. If symptoms persist 5 minutes after 1st dose call 911.       pioglitazone (ACTOS) 45 MG tablet Take 45 mg by mouth daily        Salicylic Acid-Urea (KERASAL) 5-10 % OINT Externally apply topically as needed       simvastatin (ZOCOR) 80 MG tablet Take 80 mg by mouth At Bedtime        Skin Protectants, Misc. (EUCERIN) cream Apply topically as needed for dry skin       sodium chloride (MICHELLE 128) 5 % ophthalmic  ointment Place 1 Application into both eyes At Bedtime       spironolactone (ALDACTONE) 25 MG tablet Take 25 mg by mouth daily       warfarin (COUMADIN) 4 MG tablet Take 4 mg by mouth every evening         Allergies  Allergies   Allergen Reactions     Aspirin      Other reaction(s): GI intolerance     Bitter Stop Flavor Hives     Ether      Food      Green pepper, shrimp, peanuts     Latex Hives     Metformin Hives     Povidone Iodine Hives     Seafood Hives     Adhesive Tape Rash and Hives     No Clinical Screening - See Comments Hives and Rash     IV contrast dye and bleach       Social History  Social History     Social History     Marital status: Single     Spouse name: N/A     Number of children: 1     Years of education: N/A     Occupational History     disability      Social History Main Topics     Smoking status: Former Smoker     Packs/day: 2.00     Years: 10.00     Types: Cigarettes     Quit date:      Smokeless tobacco: Never Used     Alcohol use No     Drug use: No     Sexual activity: Not on file     Other Topics Concern     Not on file     Social History Narrative       Family History  Family History   Problem Relation Age of Onset     Liver Cancer Mother      Pulmonary Embolism Father      HEART DISEASE Father      Diabetes Sister      Diabetes Brother      Diabetes Paternal Grandmother      Other - See Comments Sister       from carbon monoxide poisoning     Other - See Comments Sister       at birth     Cerebrovascular Disease Brother      HEART DISEASE Brother      pacemaker     Unknown/Adopted Maternal Half-Brother                ROS/MED HX    ENT/Pulmonary:     (+)MARIVEL risk factors snores loudly, hypertension, obese, allergic rhinitis, tobacco use, Past use 2 packs/day  , . .    Neurologic:  - neg neurologic ROS     Cardiovascular: Comment: Ischemic cardiomyopathy    (+) hypertension--CAD, --stent,unsure  3 . Taking blood thinners : . CHF etiology: systolic Last EF: 58% date: 2018 .  VALERA, . :  METS/Exercise Tolerance:  1 - Eating, dressing   Hematologic:     (+) History of blood clots pt is anticoagulated, Anemia, -     (-) History of Transfusion   Musculoskeletal:   (+) , , other musculoskeletal- periodic low back pain      GI/Hepatic:  - neg GI/hepatic ROS       Renal/Genitourinary:     (+) chronic renal disease, type: CRI, Pt does not require dialysis, Pt has no history of transplant, Other Renal/ Genitourinary, s/p right nephrectomy      Endo:     (+) type II DM Last HgA1c: 5.7 date: 9/11/2018 Not using insulin - not using insulin pump Normal glucose range: 100-140 not previously admitted for DM/DKA Diabetic complications: nephropathy cardiac problems, Obesity, .      Psychiatric:  - neg psychiatric ROS       Infectious Disease:         Malignancy:         Other:    (+) No chance of pregnancy no H/O Chronic Pain,             Temp: 97.8  F (36.6  C) Temp src: Oral BP: 168/69 Pulse: 65     SpO2: 97 %         0 lbs 0 oz  Data Unavailable   There is no height or weight on file to calculate BMI.       Physical Exam - exam completed in wheelchair  Constitutional: Awake, alert, cooperative, no apparent distress, and appears older than stated age. Morbidly obese  Eyes: Pupils equal, round and reactive to light, extra ocular muscles intact, sclera clear, conjunctiva normal.  HENT: Normocephalic, oral pharynx with moist mucus membranes. Edentulous.  No goiter appreciated.   Respiratory: Clear to auscultation bilaterally, no crackles or wheezing.  Cardiovascular: Regular rate and rhythm, normal S1 and S2, and no murmur noted.  Carotids +2, no bruits. Trace BLE edema. Palpable radial pulses,.  Diminished pedal pulses.  BLE vascular changes.   GI: Normal bowel sounds, soft, non-distended, non-tender, no masses palpated, no hepatosplenomegaly.   Lymph/Hematologic: No cervical lymphadenopathy and no supraclavicular lymphadenopathy.  Genitourinary:  deferred  Skin: Warm and dry.  LÓPEZ anticipated surgical  site.   Musculoskeletal: Full ROM of neck. There is no redness, warmth, or swelling of the exposed joints. Gross motor strength is normal.    Neurologic: Awake, alert, oriented to name, place and time. Cranial nerves II-XII are grossly intact. Gait is not assessed.  Neuropsychiatric: Calm, cooperative. Normal affect.     Labs: (personally reviewed)  Component      Latest Ref Rng & Units 9/11/2018   Sodium      133 - 144 mmol/L 141   Potassium      3.4 - 5.3 mmol/L 6.3 (HH)   Chloride      94 - 109 mmol/L 115 (H)   Carbon Dioxide      20 - 32 mmol/L 20   Anion Gap      3 - 14 mmol/L 6   Glucose      70 - 99 mg/dL 46 (LL)   Urea Nitrogen      7 - 30 mg/dL 52 (H)   Creatinine      0.52 - 1.04 mg/dL 1.70 (H)   GFR Estimate      >60 mL/min/1.7m2 30 (L)   GFR Estimate If Black      >60 mL/min/1.7m2 36 (L)   Calcium      8.5 - 10.1 mg/dL 9.2   WBC      4.0 - 11.0 10e9/L 5.8   RBC Count      3.8 - 5.2 10e12/L 3.73 (L)   Hemoglobin      11.7 - 15.7 g/dL 10.8 (L)   Hematocrit      35.0 - 47.0 % 36.1   MCV      78 - 100 fl 97   MCH      26.5 - 33.0 pg 29.0   MCHC      31.5 - 36.5 g/dL 29.9 (L)   RDW      10.0 - 15.0 % 17.4 (H)   Platelet Count      150 - 450 10e9/L 162   Hemoglobin A1C      0 - 5.6 % 5.7 (H)           EKG  7/2018  Sinus bradycardia  Inferior infarct ,age indeterminate  Anterior infarct ,age indeterminate  Abnormal ECG  No previous ECGs available      Stress test  4/2018  CONCLUSIONS  1. No exercise tolerance evaluated.  2. Normal hemodynamic response to regadenoson.  3. No regadenoson-induced angina.  4. Normal EKG response.  5. Mildly more prominent perfusion abnormality in inferior wall with stress.  6. Unable to evaluate wall motion due to technical difficulty.  7. Ejection fraction is normal, 58%.  8. This is an abnormal regadenoson stress MIBI study revealing worsening perfusion abnormality in inferior wall with stress suggestive of inferior wall ischemia.         Outside records reviewed from: Care  Everywhere          ASSESSMENT and PLAN  Simin Huddleston is a 68 year old female scheduled for a Massive Panniculectomy on 9/27/2018 by Dr. Chaves in management of morbid obesity and recurrent paniculitis.  PAC referral for risk assessment and optimization for anesthesia with comorbid conditions of: ischemic cardiomyopathy, systolic CHF, hypertension, hyperlipidemia, CAD, history of smoking, allergic rhinitis, chronic lower extremity DVT, diabetes and CKD.     Pre-operative considerations:  1.  Cardiac:  Functional status- METS is likely 2-3.  She reports that she does walk multiple times during the day at her nursing home with the assistance of a walker. She does get some exertional SOB.  She has a history of the multiple cardiac conditions listed above.  Her last stress test documents areas of ischemia consistent with old infarct and an EF of 58% (please see scanned document).  She has history of placement of 3 coronary stents in 2009.  She is medically managed with aspirin, coreg, hydralazine, imdur and lasix.  High risk surgery with 6.6% risk of major adverse cardiac event.   2.  Pulm:  Airway feasible.  MARIVEL risk: intermediate.  She quit smoking in 1974.  She is morbidly obese with a BMI >80.  3.  GI:  Risk of PONV score = 3.  If > 2, anti-emetic intervention recommended.  4. Skin:  She has BLE skin changes consistent with likely PAD and/or venous stasis.  She notes a small left anterior lower extremity wound on that is currently covered with a dressing.    5. Heme:  She is on warfarin for BLE chronic DVTs.  Her nursing home will obtain lovenox bridging instructions and orders from her primary care provider.  Stop aspirin 7 days prior to surgery.  +chronic anemia - hgb today stable at 10.8.  T&S ordered. Will order CBC recheck for DOS.  VTE risk = 4.5%.  6. Renal:  +CKD - creatinine today is higher than normal at 1.7 as her creatinine is typically WNL with a GFR in the 50's.  Will order DOS recheck.  7. Other:   Lab called today with critical lab results of K+ at 6.3 and glucose at 46.  Patient had already left in her medical transport back on her 4+ hour drive home.  ESTEPHANIA Fiore was unable to reach patient phone.  Call placed by ESTEPHANIA Fiore to nursing home nurse Evelyn.  Possible that results were related to hemolization, but instructed Evelyn to have BMP rechecked tonight at emergency room and contact her primary care provider if results were similar.  Patient had noted prior to leaving that her  would be bringing her to [facility] on her way out of town for a meal and the nurse noted that she was sent with 2 bag meals in the vehicle.  Will order recheck of BMP for DOS.  8. Musculoskeletal:  Uses a walker for ambulation.  Gait is very likely impaired.  Consider fall risk precuations.   9. Endo:  Diabetes is managed with actos and glipizide; hold DOS.       Patient is optimized and is acceptable candidate for the proposed procedure.  No further diagnostic evaluation is needed.     Patient also evaluated by Dr. Martinez. See recommendations below.               Yulissa Torrez DNP, RN, APRN  Preoperative Assessment Center  [facility]  Phone: [phone]  Fax: [phone]

## 2018-09-11 NOTE — PROGRESS NOTES
Call taken by ESTEPHANIA Fiore regarding critical lab results.  Please see her OR nursing note or my anesthesia/H&P note.    Yulissa Torrez DNP, RN, ANP-C

## 2018-09-11 NOTE — ANESTHESIA PREPROCEDURE EVALUATION
Anesthesia Evaluation     . Pt has had prior anesthetic. Type: General    No history of anesthetic complications          ROS/MED HX    ENT/Pulmonary:     (+)MARIVEL risk factors snores loudly, hypertension, obese, allergic rhinitis, tobacco use, Past use 2 packs/day  , . .    Neurologic:  - neg neurologic ROS     Cardiovascular: Comment: Ischemic cardiomyopathy    (+) hypertension--CAD, --stent,unsure  3 . Taking blood thinners : . CHF etiology: systolic Last EF: 58% date: 4/2018 . VALERA, . :. . Previous cardiac testing date:results:Stress Testdate:4/25/2018 results:1. No exercise tolerance evaluated.  2. Normal hemodynamic response to regadenoson.  3. No regadenoson-induced angina.  4. Normal EKG response.  5. Mildly more prominent perfusion abnormality in inferior wall with stress.  6. Unable to evaluate wall motion due to technical difficulty.  7. Ejection fraction is normal, 58%.  8. This is an abnormal regadenoson stress MIBI study revealing worsening perfusion abnormality in inferior wall with stress suggestive of inferior wall ischemia.   ECG reviewed date:7/6/2018 results:Sinus bradycardia  Inferior infarct ,age indeterminate  Anterior infarct ,age indeterminate  Abnormal ECG  No previous ECGs available date: results:          METS/Exercise Tolerance:  1 - Eating, dressing   Hematologic:     (+) History of blood clots pt is anticoagulated, Anemia, -     (-) History of Transfusion   Musculoskeletal:   (+) , , other musculoskeletal- periodic low back pain      GI/Hepatic:  - neg GI/hepatic ROS       Renal/Genitourinary:     (+) chronic renal disease, type: CRI, Pt does not require dialysis, Pt has no history of transplant, Other Renal/ Genitourinary, s/p right nephrectomy      Endo:     (+) Last HgA1c: 5.7 date: 9/11/2018 Not using insulin - not using insulin pump Normal glucose range: 100-140 not previously admitted for DM/DKA Diabetic complications: nephropathy cardiac problems, Obesity, .   (-) Type I DM    Psychiatric:  - neg psychiatric ROS       Infectious Disease:         Malignancy:         Other:    (+) No chance of pregnancy no H/O Chronic Pain,                   Physical Exam  Normal systems: pulmonary and dental    Airway   Mallampati: III  TM distance: >3 FB  Neck ROM: full    Dental   (+) upper dentures and lower dentures  Comment: edentulous    Cardiovascular   Rhythm and rate: regular and normal  (+) peripheral edema and weak pulses       Pulmonary    breath sounds clear to auscultation    Other findings: Morbidly obese  BLE vascular changes consistent with venous stasis and/or PAD           PAC Discussion and Assessment    ASA Classification: 3  Case is suitable for: [place]  Anesthetic techniques and relevant risks discussed: GA  Invasive monitoring and risk discussed: Yes  Types:   Possibility and Risk of blood transfusion discussed: Yes  NPO instructions given:   Additional anesthetic preparation and risks discussed: Invasive monitoring  Needs early admission to pre-op area:   Other:     PAC Resident/NP Anesthesia Assessment:  Simin Huddleston is a 68 year old female scheduled for a Massive Panniculectomy on 9/27/2018 by Dr. Chaves in management of morbid obesity and recurrent paniculitis.  PAC referral for risk assessment and optimization for anesthesia with comorbid conditions of: ischemic cardiomyopathy, systolic CHF, hypertension, hyperlipidemia, CAD, history of smoking, allergic rhinitis, chronic lower extremity DVT, diabetes and CKD.     Pre-operative considerations:  1.  Cardiac:  Functional status- METS is likely 2-3.  She reports that she does walk multiple times during the day at her nursing home with the assistance of a walker. She does get some exertional SOB.  She has a history of the multiple cardiac conditions listed above.  Her last stress test documents areas of ischemia consistent with old infarct and an EF of 58% (please see scanned document).  She has history of placement of 3  coronary stents in 2009.  She is medically managed with aspirin, coreg, hydralazine, imdur and lasix.  High risk surgery with 6.6% risk of major adverse cardiac event.   2.  Pulm:  Airway feasible.  MARIVEL risk: intermediate.  She quit smoking in 1974.  She is morbidly obese with a BMI >80.  3.  GI:  Risk of PONV score = 3.  If > 2, anti-emetic intervention recommended.  4. Skin:  She has BLE skin changes consistent with likely PAD and/or venous stasis.  She notes a small left anterior lower extremity wound on that is currently covered with a dressing.    5. Heme:  She is on warfarin for BLE chronic DVTs.  Her nursing home will obtain lovenox bridging instructions and orders from her primary care provider.  Stop aspirin 7 days prior to surgery.  +chronic anemia - hgb today stable at 10.8.  T&S ordered. Will order CBC recheck for DOS.  VTE risk = 4.5%.  6. Renal:  +CKD - creatinine today is higher than normal at 1.7 as her creatinine is typically WNL with a GFR in the 50's.  Will order DOS recheck.  7. Other:  Lab called today with critical lab results of K+ at 6.3 and glucose at 46.  Patient had already left in her medical transport back on her 4+ hour drive home.  ESTEPHANIA Fiore was unable to reach patient phone.  Call placed by ESTEPHANIA Fiore to nursing home nurse Evelyn.  Possible that results were related to hemolization, but instructed Evelyn to have BMP rechecked tonight at emergency room and contact her primary care provider if results were similar.  Patient had noted prior to leaving that her  would be bringing her to [facility] on her way out of town for a meal and the nurse noted that she was sent with 2 bag meals in the vehicle.  Will order recheck of BMP for DOS.  8. Musculoskeletal:  Uses a walker for ambulation.  Gait is very likely impaired.  Consider fall risk precuations.   9. Endo:  Diabetes is managed with actos and glipizide; hold DOS.       Patient is optimized and is acceptable candidate for the  proposed procedure.  No further diagnostic evaluation is needed.     Patient also evaluated by Dr. Martinez. See recommendations below.     **For further details of assessment, testing, and physical exam please see H and P completed on same date.          Yulissa Torrez DNP, RN, APRN      Reviewed and Signed by PAC Mid-Level Provider/Resident  Mid-Level Provider/Resident: Yulissa Torrez DNP, RN, APRN  Date: 9/11/2018  Time: 1754    Attending Anesthesiologist Anesthesia Assessment:  68 year old for massive panniculectomy in management of morbid obesity and panniculitis. Patient ha known CAD, with prior stents 2009, stable since then. She is at significant risk with this surgery, with ischemic cardiomyopathy, systolic CHF, LE DVT and renal dysfunction. She has seen her cardiologist, and all agree that patient is at an acceptable risk, even though it is elevated. The patient is aware of the increased risks and clearly wishes to move forward. We discussed invasive monitoring as well as blood transfusions.     She had sestimibi stress test 4/25/2018 and it showed inducible ischemia in the inferior region, consistent with her known  of the distal RCA. EF is 56%. She will be a fluid management challenge, would consider GDT with FloTrak.     Chart reviewed, patient seen and evaluated; agree with above assessment.    Patient is appropriate for the planned procedure without further workup or medical management change. The final anesthesia plan will be determined by the physician anesthesiologist caring for the patient on the day of surgery.      Reviewed and Signed by PAC Anesthesiologist  Anesthesiologist: jessica  Date: 9/11/2018  Time:   Pass/Fail: Pass  Disposition:     PAC Pharmacist Assessment:        Pharmacist:   Date:   Time:      Anesthesia Plan      History & Physical Review  History and physical reviewed and following examination; no interval change.    ASA Status:  3 .    NPO Status:  > 8 hours    Plan for  General and ETT with Intravenous and Propofol induction. Maintenance will be Balanced.    PONV prophylaxis:  Ondansetron (or other 5HT-3) and Dexamethasone or Solumedrol  Additional equipment: Videolaryngoscope, 2nd IV, Arterial Line and Central Line      Postoperative Care  Postoperative pain management:  IV analgesics and Multi-modal analgesia.      Consents  Anesthetic plan, risks, benefits and alternatives discussed with:  Patient.  Use of blood products discussed: Yes.   Use of blood products discussed with Patient.  Consented to blood products.  .                          .

## 2018-09-11 NOTE — PHARMACY - PREOPERATIVE ASSESSMENT CENTER
Anticoagulation Note - Preoperative Assessment Center (PAC) Pharmacist     Patient seen and interviewed during time of PAC Clinic appointment September 11, 2018.  The purpose of this note is to document the perioperative anticoagulation plan outlined by the providers caring for Simin Huddleston.     Current Regimen  Anticoagulation Regimen as of September 11, 2018: warfarin 4 mg PO daily in the evening  Indication: DVT x2 in BLE (patient approximates these were 9-10 years ago)  Prescriber:  Dr. Vogel (PCP)  Expected Duration of therapy: undetermined  Current medications that may interact with this include: aspirin  INR Goal: 2-3 (per outpatient records in June 2018)  Recent Change in oral intake/nutrition: No    Perioperative plan  Simin Huddleston is scheduled for panniculectomy on 9/27/18 with Dr. Chaves.   Per H&P back in June by Dr. Vogel (patients PCP) plan was to bridge with lovenox. Final plan per Dr. Vogel and will be administered by staff at [facility].  Patient was instructed to call the PAC clinic if there are any issues or confusion regarding this plan.    Resumption of anticoagulation after procedure will be based on surgery team assessment of bleeding risks and complications.  This plan may require re-assessment and modification by her primary team in the perioperative setting depending on patients clinical situation.        Chao Sam RPH  September 11, 2018  2:40 PM

## 2018-09-11 NOTE — OR NURSING
Call from [facility] regarding critical results-Potassium 6.3 and Glucose 46.  Yulissa Torrez DNP was notified of results and asked for the Care Center to be notified and to draw a metabolic panel this evening on the patients return; the results should be called to the patients PCP.  I attempted to call the patient and was unsuccessful.  I did call the [facility], where the patient resides.  I spoke to ESTEPHANIA Rivas, I gave her the order for the Metabolic Panel to be drawn this evening and to have the patients PCP to follow the results. Evelyn also stated the patient was sent with a lunch and they did not have a number for the .

## 2018-09-11 NOTE — MR AVS SNAPSHOT
After Visit Summary   2018    Simin Huddleston    MRN: [id number]           Patient Information     Date Of Birth          1950        Visit Information        Provider Department      2018 6:00 PM Yulissa Torrez APRN CNP [facility] Preoperative Assessment Center        Today's Diagnoses     Preop examination    -  1    Panniculitis          Care Instructions    Preparing for Your Surgery      Name:  Simin Huddleston   MRN:  [id number]   :  1950   Today's Date:  2018     Arriving for surgery:  Surgery date:  18    Arrival time:  10:30 am    Please come to:       [facility] [address]  [address] [place]  [address]    -   parking is available in front of the hospital from 5:15 am to 8:00 pm    -  Stop at the Information Desk in the lobby    -   Inform the information person that you are here for surgery. An escort to 3c will be provided. If you would not like an escort, please proceed to 3C on the 3rd floor. [phone]     What can I eat or drink?  -  You may have solid food or milk products until 8 hours prior to your surgery.  -  You may have water, apple juice or 7up/Sprite until 2 hours prior to your surgery.    Which medicines can I take?    Stop Aspirin, vitamins and supplements one week prior to surgery.  Hold Ibuprofen and Naproxen for 24 hours prior to surgery.     -  Do NOT take these medications in the morning, the day of surgery:  actos + glipizide if normally taken in the morning, follow instructions for holding coumadin from Primary Care Physician.    -  Please take these medications the day of surgery:  Tylenol if need, take all other scheduled medications normally taken in the morning.    How do I prepare myself?  -  Take two showers: one the night before surgery; and one the morning of surgery.         Use Scrubcare or Hibiclens to wash from neck down.  You may use your own     shampoo and  conditioner. No other hair products.   -  Do NOT use lotion, powder, deodorant, or antiperspirant the day of your surgery.  -  Do NOT wear any makeup, fingernail polish or jewelry.    - Do not bring your own medications to the hospital, except for inhalers and eye   drops.  -  Bring your ID and insurance card.    Questions or Concerns:  -If you have questions or concerns regarding the day of surgery, please call [phone].     -If you are scheduled at the Ambulatory Surgery Center please call [phone].    -For questions after surgery please call your surgeons office.                     Follow-ups after your visit        Your next 10 appointments already scheduled     Sep 11, 2018  6:00 PM CDT   (Arrive by 5:45 PM)   PAC EVALUATION with PERFECTO Augustin [facility] Preoperative Assessment Center ([facility])    [address] [place] [id number] [phone]            Sep 27, 2018   Procedure with AGUSTO Chaves MD   [facility] [place], Same Day Surgery (--)    [address] [facility] [id number] [phone]            Oct 03, 2018 11:30 AM CDT   (Arrive by 11:15 AM)   Post-Op with AGUSTO Chaves MD   [facility] Plastic and Reconstructive Surgery ([facility])    [address] [place] [id number] [phone]              Future tests that were ordered for you today     Open Future Orders        Priority Expected Expires Ordered    ABO/Rh type and screen Routine 9/11/2018 10/11/2018 9/11/2018    Basic metabolic panel Routine 9/11/2018 10/11/2018 9/11/2018    CBC with platelets Routine 9/11/2018 10/11/2018 9/11/2018    Hemoglobin A1c Routine 9/11/2018 10/11/2018 9/11/2018            Who to contact     Please call your clinic at [phone] to:    Ask questions about your health    Make or cancel appointments    Discuss your medicines    Learn about your test results    Speak to your  doctor            Additional Information About Your Visit        Care EveryWhere ID     This is your Care EveryWhere ID. This could be used by other organizations to access your [place] medical records  [id number]         Blood Pressure from Last 3 Encounters:   02/21/18 (!) 215/96    Weight from Last 3 Encounters:   02/21/18 126 kg (277 lb 11.2 oz)               Primary Care Provider Office Phone # Fax #    Rafia Vogel [phone]       [address] [address]        Equal Access to Services     [facility]: Hadii aad ku hadasho Soomaali, waaxda luqadaha, qaybta kaalmada adeegyada, waxseveriano chacon haymerle patton . So [facility] [phone].    ATENCIÓN: Si habla español, tiene a romano disposición servicios gratuitos de asistencia lingüística. [facility] [phone].    We comply with applicable federal civil rights laws and [place] laws. We do not discriminate on the basis of race, color, national origin, age, disability, sex, sexual orientation, or gender identity.            Thank you!     Thank you for choosing [facility] PREOPERATIVE ASSESSMENT CENTER  for your care. Our goal is always to provide you with excellent care. Hearing back from our patients is one way we can continue to improve our services. Please take a few minutes to complete the written survey that you may receive in the mail after your visit with us. Thank you!             Your Updated Medication List - Protect others around you: Learn how to safely use, store and throw away your medicines at www.disposemymeds.org.          This list is accurate as of 9/11/18  2:17 PM.  Always use your most recent med list.                   Brand Name Dispense Instructions for use Diagnosis    aspirin 81 MG tablet      Take 81 mg by mouth        carvedilol 12.5 MG tablet    COREG     Take 12.5 mg by mouth        furosemide 20 MG tablet    LASIX     Take 20 mg by mouth        glipiZIDE 10 MG 24 hr tablet     GLUCOTROL XL     Take 10 mg by mouth        hydrALAZINE 50 MG tablet    APRESOLINE     Take 50 mg by mouth        iron 325 (65 Fe) MG tablet      Take 325 mg by mouth        isosorbide mononitrate 30 MG 24 hr tablet    IMDUR     Take 30 mg by mouth        loratadine 10 MG tablet    CLARITIN     Take 10 mg by mouth daily        pioglitazone 45 MG tablet    ACTOS     Take 45 mg by mouth        simvastatin 80 MG tablet    ZOCOR     Take 80 mg by mouth        warfarin 2.5 MG tablet    COUMADIN     Take 2.5 mg by mouth

## 2018-09-11 NOTE — MR AVS SNAPSHOT
After Visit Summary   9/11/2018    Simin Huddleston    MRN: [id number]           Patient Information     Date Of Birth          1950        Visit Information        Provider Department      9/11/2018 12:30 PM Pharmacist, Eveline Eldridge [facility] Preoperative Assessment Center        Today's Diagnoses     Preop examination    -  1       Follow-ups after your visit        Your next 10 appointments already scheduled     Sep 11, 2018  6:00 PM CDT   (Arrive by 5:45 PM)   PAC EVALUATION with PERFECTO Augustin CNP   [facility] Preoperative Assessment Center ([facility])    [address]  [address] [facility] [id number] [phone]            Sep 27, 2018   Procedure with AGUSTO Chaves MD   [facility], [place], Same Day Surgery (--)    [address] [id number] [phone]            Oct 03, 2018 11:30 AM CDT   (Arrive by 11:15 AM)   Post-Op with AGUSTO Chaves MD   [facility] Plastic and Reconstructive Surgery ([facility])    [address]  [address] [facility] [id number] [phone]              Future tests that were ordered for you today     Open Future Orders        Priority Expected Expires Ordered    ABO/Rh type and screen Routine 9/11/2018 10/11/2018 9/11/2018    Basic metabolic panel Routine 9/11/2018 10/11/2018 9/11/2018    CBC with platelets Routine 9/11/2018 10/11/2018 9/11/2018    Hemoglobin A1c Routine 9/11/2018 10/11/2018 9/11/2018            Who to contact     Please call your clinic at [phone] to:    Ask questions about your health    Make or cancel appointments    Discuss your medicines    Learn about your test results    Speak to your doctor            Additional Information About Your Visit        Care EveryWhere ID     This is your Care EveryWhere ID. This could be used by other organizations to access your [place] medical records  [id number]         Blood Pressure from Last 3  Encounters:   02/21/18 (!) 215/96    Weight from Last 3 Encounters:   02/21/18 126 kg (277 lb 11.2 oz)              Today, you had the following     No orders found for display       Primary Care Provider Office Phone # Fax #    Rafia Vogel [phone]       [address] [address]        Equal Access to Services     PEBBLES SOTO : Hadii aad ku hadasho Soomaali, waaxda luqadaha, qaybta kaalmada adeegyada, waxay idiin hayaan adeeg kharash la'aan ah. So [facility] [phone].    ATENCIÓN: Si laurenla андрей, tiene a romano disposición servicios gratuitos de asistencia lingüística. Llame al [phone].    We comply with applicable federal civil rights laws and [place] laws. We do not discriminate on the basis of race, color, national origin, age, disability, sex, sexual orientation, or gender identity.            Thank you!     Thank you for choosing [facility] PREOPERATIVE ASSESSMENT CENTER  for your care. Our goal is always to provide you with excellent care. Hearing back from our patients is one way we can continue to improve our services. Please take a few minutes to complete the written survey that you may receive in the mail after your visit with us. Thank you!             Your Updated Medication List - Protect others around you: Learn how to safely use, store and throw away your medicines at www.disposemymeds.org.          This list is accurate as of 9/11/18  2:39 PM.  Always use your most recent med list.                   Brand Name Dispense Instructions for use Diagnosis    acetaminophen 325 MG tablet    TYLENOL     Take 650 mg by mouth 2 times daily        aspirin 81 MG tablet      Take 81 mg by mouth daily        bisacodyl 10 MG Suppository    DULCOLAX     Place 10 mg rectally daily as needed for constipation        Carboxymethylcellulose Sod PF 0.5 % Soln ophthalmic solution    REFRESH PLUS     Place 1 drop into both eyes 4 times daily as needed for dry eyes         carvedilol 12.5 MG tablet    COREG     Take 12.5 mg by mouth 2 times daily (with meals)        diclofenac 1 % Gel topical gel    VOLTAREN     Place 4 g onto the skin 4 times daily        diphenhydrAMINE-acetaminophen  MG tablet    TYLENOL PM     Take 1 tablet by mouth nightly as needed for sleep        eucerin cream      Apply topically as needed for dry skin        furosemide 20 MG tablet    LASIX     Take 20 mg by mouth daily        glipiZIDE 10 MG 24 hr tablet    GLUCOTROL XL     Take 10 mg by mouth daily (with breakfast)        hydrALAZINE 50 MG tablet    APRESOLINE     Take 50 mg by mouth 2 times daily        iron 325 (65 Fe) MG tablet      Take 325 mg by mouth daily (with breakfast)        isosorbide mononitrate 30 MG 24 hr tablet    IMDUR     Take 30 mg by mouth daily        KERASAL 5-10 % Oint   Generic drug:  Salicylic Acid-Urea      Externally apply topically as needed        loratadine 10 MG tablet    CLARITIN     Take 10 mg by mouth daily        magnesium hydroxide 400 MG/5ML suspension    MILK OF MAGNESIA     Take 30 mLs by mouth daily as needed for constipation        nitroGLYcerin 0.4 MG sublingual tablet    NITROSTAT     Place 0.4 mg under the tongue every 5 minutes as needed for chest pain For chest pain place 1 tablet under the tongue every 5 minutes for 3 doses. If symptoms persist 5 minutes after 1st dose call 911.        pioglitazone 45 MG tablet    ACTOS     Take 45 mg by mouth daily        simvastatin 80 MG tablet    ZOCOR     Take 80 mg by mouth At Bedtime        sodium chloride 5 % ophthalmic ointment    MICHELLE 128     Place 1 Application into both eyes At Bedtime        spironolactone 25 MG tablet    ALDACTONE     Take 25 mg by mouth daily        warfarin 4 MG tablet    COUMADIN     Take 4 mg by mouth every evening

## 2018-09-11 NOTE — PROGRESS NOTES
Preoperative Assessment Center medication history for September 11, 2018 is complete.    See Epic admission navigator for prior to admission medications.   Operating room staff will still need to confirm medications and last dose information on day of surgery.     Medication history interview sources:  patient[facility]    Changes made to [facility] medication list (reason)  Added: voltaren gel, refresh, spironolactone, kerasal, milk of magnesia,   Deleted: none  Changed: acetaminophen, warfarin, simvastatin, actos, coreg,     Additional medication history information (including reliability of information, actions taken by pharmacist):    -- No recent (within 30 days) course of antibiotics  -- No recent (within 30 days) course of steroids  -- Patient declines being on any other prescription or over-the-counter medications    Prior to Admission medications    Medication Sig Last Dose Taking? Auth Provider   acetaminophen (TYLENOL) 325 MG tablet Take 650 mg by mouth 2 times daily Taking Yes Unknown, Entered By History   aspirin 81 MG tablet Take 81 mg by mouth daily  Taking Yes Reported, Patient   bisacodyl (DULCOLAX) 10 MG Suppository Place 10 mg rectally daily as needed for constipation Taking Yes Unknown, Entered By History   Carboxymethylcellulose Sod PF (REFRESH PLUS) 0.5 % SOLN ophthalmic solution Place 1 drop into both eyes 4 times daily as needed for dry eyes Taking Yes Unknown, Entered By History   carvedilol (COREG) 12.5 MG tablet Take 12.5 mg by mouth 2 times daily (with meals)  Taking Yes Reported, Patient   diclofenac (VOLTAREN) 1 % GEL topical gel Place 4 g onto the skin 4 times daily Taking Yes Unknown, Entered By History   diphenhydrAMINE-acetaminophen (TYLENOL PM)  MG tablet Take 1 tablet by mouth nightly as needed for sleep Taking Yes Unknown, Entered By History   Ferrous Sulfate (IRON) 325 (65 FE) MG tablet Take 325 mg by mouth daily (with breakfast)   Taking Yes Reported, Patient   furosemide (LASIX) 20 MG tablet Take 20 mg by mouth daily  Taking Yes Reported, Patient   glipiZIDE (GLUCOTROL XL) 10 MG 24 hr tablet Take 10 mg by mouth daily (with breakfast)  Taking Yes Reported, Patient   hydrALAZINE (APRESOLINE) 50 MG tablet Take 50 mg by mouth 2 times daily  Taking Yes Reported, Patient   isosorbide mononitrate (IMDUR) 30 MG 24 hr tablet Take 30 mg by mouth daily  Taking Yes Reported, Patient   loratadine (CLARITIN) 10 MG tablet Take 10 mg by mouth daily Taking Yes Reported, Patient   magnesium hydroxide (MILK OF MAGNESIA) 400 MG/5ML suspension Take 30 mLs by mouth daily as needed for constipation Taking Yes Unknown, Entered By History   nitroGLYcerin (NITROSTAT) 0.4 MG sublingual tablet Place 0.4 mg under the tongue every 5 minutes as needed for chest pain For chest pain place 1 tablet under the tongue every 5 minutes for 3 doses. If symptoms persist 5 minutes after 1st dose call 911. Taking Yes Unknown, Entered By History   pioglitazone (ACTOS) 45 MG tablet Take 45 mg by mouth daily  Taking Yes Reported, Patient   Salicylic Acid-Urea (KERASAL) 5-10 % OINT Externally apply topically as needed Taking Yes Unknown, Entered By History   simvastatin (ZOCOR) 80 MG tablet Take 80 mg by mouth At Bedtime  Taking Yes Reported, Patient   Skin Protectants, Misc. (EUCERIN) cream Apply topically as needed for dry skin Taking Yes Unknown, Entered By History   sodium chloride (MICHELLE 128) 5 % ophthalmic ointment Place 1 Application into both eyes At Bedtime Taking Yes Unknown, Entered By History   spironolactone (ALDACTONE) 25 MG tablet Take 25 mg by mouth daily Taking Yes Unknown, Entered By History   warfarin (COUMADIN) 4 MG tablet Take 4 mg by mouth every evening Taking Yes Unknown, Entered By History        Medication history completed by: Chao Sam, [facility]

## 2018-09-11 NOTE — PATIENT INSTRUCTIONS
Preparing for Your Surgery      Name:  Simin Huddleston   MRN:  [id number]   :  1950   Today's Date:  2018     Arriving for surgery:  Surgery date:  18    Arrival time:  10:30 am    Please come to:       [facility] [address]  [address] [place]  [address]    -   parking is available in front of the hospital from 5:15 am to 8:00 pm    -  Stop at the Information Desk in the lobby    -   Inform the information person that you are here for surgery. An escort to 3c will be provided. If you would not like an escort, please proceed to 3C on the 3rd floor. [phone]     What can I eat or drink?  -  You may have solid food or milk products until 8 hours prior to your surgery.  -  You may have water, apple juice or 7up/Sprite until 2 hours prior to your surgery.    Which medicines can I take?    Stop Aspirin, vitamins and supplements one week prior to surgery.  Hold Ibuprofen and Naproxen for 24 hours prior to surgery.     -  Do NOT take these medications in the morning, the day of surgery:  actos + glipizide if normally taken in the morning, follow instructions for holding coumadin from Primary Care Physician.    -  Please take these medications the day of surgery:  Tylenol if need, take all other scheduled medications normally taken in the morning.    How do I prepare myself?  -  Take two showers: one the night before surgery; and one the morning of surgery.         Use Scrubcare or Hibiclens to wash from neck down.  You may use your own     shampoo and conditioner. No other hair products.   -  Do NOT use lotion, powder, deodorant, or antiperspirant the day of your surgery.  -  Do NOT wear any makeup, fingernail polish or jewelry.    - Do not bring your own medications to the hospital, except for inhalers and eye   drops.  -  Bring your ID and insurance card.    Questions or Concerns:  -If you have questions or concerns regarding the day of surgery,  please call [phone].     -If you are scheduled at the Ambulatory Surgery Center please call [phone].    -For questions after surgery please call your surgeons office.

## 2018-09-12 ENCOUNTER — CARE COORDINATION (OUTPATIENT)
Dept: PLASTIC SURGERY | Facility: CLINIC | Age: 68
End: 2018-09-12

## 2018-09-12 ENCOUNTER — TRANSFERRED RECORDS (OUTPATIENT)
Dept: HEALTH INFORMATION MANAGEMENT | Facility: CLINIC | Age: 68
End: 2018-09-12

## 2018-09-12 NOTE — PROGRESS NOTES
Lizbet at [facility] contacted.  Verbal order from Dr. Chaves--he would like Simin to stay on aspirin up until the day of surgery. She verbalized understanding.

## 2018-09-13 ENCOUNTER — TELEPHONE (OUTPATIENT)
Dept: SURGERY | Facility: CLINIC | Age: 68
End: 2018-09-13

## 2018-09-13 NOTE — TELEPHONE ENCOUNTER
Received email from  financial securing person, wanting to know if patient meets medicare guidelines, no PA is required for Medicare

## 2018-09-14 ENCOUNTER — CARE COORDINATION (OUTPATIENT)
Dept: PLASTIC SURGERY | Facility: CLINIC | Age: 68
End: 2018-09-14

## 2018-09-14 NOTE — PROGRESS NOTES
Contacted Lizbte at [facility].  Dr. Chaves recommends that PCP jose Duval in TCU try to decrease her K+ prior to surgery.  Lizbet states that he has just started a medication for lower the K+ and they will repeat lab on Monday.

## 2018-09-17 ENCOUNTER — TRANSFERRED RECORDS (OUTPATIENT)
Dept: HEALTH INFORMATION MANAGEMENT | Facility: CLINIC | Age: 68
End: 2018-09-17

## 2018-09-20 ENCOUNTER — CARE COORDINATION (OUTPATIENT)
Dept: PLASTIC SURGERY | Facility: CLINIC | Age: 68
End: 2018-09-20

## 2018-09-25 NOTE — OR NURSING
Call From ESTEPHANIA Burnett at [facility] in [facility].  Most recent K+on 9-24 was 5.9. They have given Simin a gram of kayexalate, will repeat another one gram dose tonight   and redraw in am.   Given Jenelle's direct number. LM Jenelle's VM.  Yuli Cox RN  PARDO  [phone]

## 2018-09-26 ENCOUNTER — CARE COORDINATION (OUTPATIENT)
Dept: PLASTIC SURGERY | Facility: CLINIC | Age: 68
End: 2018-09-26

## 2018-09-26 ENCOUNTER — TRANSFERRED RECORDS (OUTPATIENT)
Dept: HEALTH INFORMATION MANAGEMENT | Facility: CLINIC | Age: 68
End: 2018-09-26

## 2018-09-26 LAB
CREAT SERPL-MCNC: 1.33 MG/DL (ref 0.5–1.2)
GFR SERPL CREATININE-BSD FRML MDRD: 40 ML/MIN/1.73M2
GLUCOSE SERPL-MCNC: 114 MG/DL (ref 70–100)
POTASSIUM SERPL-SCNC: 4.4 MEQ/L (ref 3.5–5)

## 2018-09-26 NOTE — OR NURSING
No BMI on file for pt. Pt's surgery is tomorrow.I called the nurse at [facility], and spoke to the nurse. Pt is 4.0 ft tall and weighs 248 lbs. BMI =75.7. Charge nurse on 3 C notified.

## 2018-09-26 NOTE — PROGRESS NOTES
Drawn today:  Na  143   K    4.4   Ch  111 ()  H   CO2  20  (22-32)  L   Anion gap  12   BUN  46  (8-24)  H   Cr   1.33  (.50-1.20)  H   GFR   40  (>60)  L   Ca  8.8     Reviewed with Dr. Chaves and Dr. Nunez (primary care).  Both have cleared patient for surgery tomorrow based on these results.  Lex RN at [facility] contacted.  He states they will repeat K+ at 3:00 pm today and contact us.

## 2018-09-27 ENCOUNTER — ANESTHESIA (OUTPATIENT)
Dept: SURGERY | Facility: CLINIC | Age: 68
End: 2018-09-27
Payer: MEDICARE

## 2018-09-27 ENCOUNTER — HOSPITAL ENCOUNTER (OUTPATIENT)
Facility: CLINIC | Age: 68
Setting detail: OBSERVATION
Discharge: NURSING, REHAB OR LONG-TERM CARE | End: 2018-09-28
Attending: PLASTIC SURGERY | Admitting: PLASTIC SURGERY
Payer: MEDICARE

## 2018-09-27 ENCOUNTER — SURGERY (OUTPATIENT)
Age: 68
End: 2018-09-27

## 2018-09-27 DIAGNOSIS — J30.2 SEASONAL ALLERGIC RHINITIS, UNSPECIFIED CHRONICITY, UNSPECIFIED TRIGGER: ICD-10-CM

## 2018-09-27 DIAGNOSIS — M79.3 PANNICULITIS: Primary | ICD-10-CM

## 2018-09-27 DIAGNOSIS — Z86.718 H/O DEEP VENOUS THROMBOSIS: ICD-10-CM

## 2018-09-27 DIAGNOSIS — E11.8 TYPE 2 DIABETES MELLITUS WITH COMPLICATION, WITHOUT LONG-TERM CURRENT USE OF INSULIN (H): ICD-10-CM

## 2018-09-27 DIAGNOSIS — I25.119 CORONARY ARTERY DISEASE INVOLVING NATIVE HEART WITH ANGINA PECTORIS, UNSPECIFIED VESSEL OR LESION TYPE (H): ICD-10-CM

## 2018-09-27 DIAGNOSIS — Z98.890 S/P PANNICULECTOMY: ICD-10-CM

## 2018-09-27 DIAGNOSIS — I10 BENIGN ESSENTIAL HYPERTENSION: ICD-10-CM

## 2018-09-27 DIAGNOSIS — D50.9 IRON DEFICIENCY ANEMIA, UNSPECIFIED IRON DEFICIENCY ANEMIA TYPE: ICD-10-CM

## 2018-09-27 DIAGNOSIS — M19.90 ARTHRITIS: ICD-10-CM

## 2018-09-27 LAB
ABO + RH BLD: NORMAL
ABO + RH BLD: NORMAL
ANION GAP SERPL CALCULATED.3IONS-SCNC: 6 MMOL/L (ref 3–14)
BLD GP AB SCN SERPL QL: NORMAL
BLD PROD TYP BPU: NORMAL
BLD UNIT ID BPU: 0
BLD UNIT ID BPU: 0
BLOOD BANK CMNT PATIENT-IMP: NORMAL
BLOOD BANK CMNT PATIENT-IMP: NORMAL
BLOOD PRODUCT CODE: NORMAL
BLOOD PRODUCT CODE: NORMAL
BPU ID: NORMAL
BPU ID: NORMAL
BUN SERPL-MCNC: 37 MG/DL (ref 7–30)
CALCIUM SERPL-MCNC: 8.9 MG/DL (ref 8.5–10.1)
CHLORIDE SERPL-SCNC: 115 MMOL/L (ref 94–109)
CO2 SERPL-SCNC: 23 MMOL/L (ref 20–32)
CREAT SERPL-MCNC: 1.24 MG/DL (ref 0.52–1.04)
CREAT SERPL-MCNC: 1.36 MG/DL (ref 0.52–1.04)
ERYTHROCYTE [DISTWIDTH] IN BLOOD BY AUTOMATED COUNT: 17.5 % (ref 10–15)
ERYTHROCYTE [DISTWIDTH] IN BLOOD BY AUTOMATED COUNT: 17.5 % (ref 10–15)
GFR SERPL CREATININE-BSD FRML MDRD: 38 ML/MIN/1.7M2
GFR SERPL CREATININE-BSD FRML MDRD: 43 ML/MIN/1.7M2
GLUCOSE BLDC GLUCOMTR-MCNC: 108 MG/DL (ref 70–99)
GLUCOSE BLDC GLUCOMTR-MCNC: 128 MG/DL (ref 70–99)
GLUCOSE BLDC GLUCOMTR-MCNC: 147 MG/DL (ref 70–99)
GLUCOSE SERPL-MCNC: 103 MG/DL (ref 70–99)
HBA1C MFR BLD: 4.8 % (ref 0–5.6)
HCT VFR BLD AUTO: 27.3 % (ref 35–47)
HCT VFR BLD AUTO: 33.2 % (ref 35–47)
HGB BLD-MCNC: 8.1 G/DL (ref 11.7–15.7)
HGB BLD-MCNC: 9.8 G/DL (ref 11.7–15.7)
INR PPP: 1.13 (ref 0.86–1.14)
MCH RBC QN AUTO: 28.7 PG (ref 26.5–33)
MCH RBC QN AUTO: 29 PG (ref 26.5–33)
MCHC RBC AUTO-ENTMCNC: 29.5 G/DL (ref 31.5–36.5)
MCHC RBC AUTO-ENTMCNC: 29.7 G/DL (ref 31.5–36.5)
MCV RBC AUTO: 97 FL (ref 78–100)
MCV RBC AUTO: 98 FL (ref 78–100)
NUM BPU REQUESTED: 2
PLATELET # BLD AUTO: 141 10E9/L (ref 150–450)
PLATELET # BLD AUTO: 144 10E9/L (ref 150–450)
PLATELET # BLD AUTO: 158 10E9/L (ref 150–450)
POTASSIUM SERPL-SCNC: 4.5 MMOL/L (ref 3.4–5.3)
RBC # BLD AUTO: 2.79 10E12/L (ref 3.8–5.2)
RBC # BLD AUTO: 3.42 10E12/L (ref 3.8–5.2)
SODIUM SERPL-SCNC: 144 MMOL/L (ref 133–144)
SPECIMEN EXP DATE BLD: NORMAL
TRANSFUSION STATUS PATIENT QL: NORMAL
WBC # BLD AUTO: 6.1 10E9/L (ref 4–11)
WBC # BLD AUTO: 6.8 10E9/L (ref 4–11)

## 2018-09-27 PROCEDURE — 25000128 H RX IP 250 OP 636: Performed by: STUDENT IN AN ORGANIZED HEALTH CARE EDUCATION/TRAINING PROGRAM

## 2018-09-27 PROCEDURE — 83036 HEMOGLOBIN GLYCOSYLATED A1C: CPT | Performed by: STUDENT IN AN ORGANIZED HEALTH CARE EDUCATION/TRAINING PROGRAM

## 2018-09-27 PROCEDURE — 25000128 H RX IP 250 OP 636: Performed by: NURSE ANESTHETIST, CERTIFIED REGISTERED

## 2018-09-27 PROCEDURE — 25000125 ZZHC RX 250: Performed by: STUDENT IN AN ORGANIZED HEALTH CARE EDUCATION/TRAINING PROGRAM

## 2018-09-27 PROCEDURE — 71000015 ZZH RECOVERY PHASE 1 LEVEL 2 EA ADDTL HR: Performed by: PLASTIC SURGERY

## 2018-09-27 PROCEDURE — 27210794 ZZH OR GENERAL SUPPLY STERILE: Performed by: PLASTIC SURGERY

## 2018-09-27 PROCEDURE — 25000125 ZZHC RX 250: Performed by: PLASTIC SURGERY

## 2018-09-27 PROCEDURE — A9270 NON-COVERED ITEM OR SERVICE: HCPCS | Mod: GY | Performed by: STUDENT IN AN ORGANIZED HEALTH CARE EDUCATION/TRAINING PROGRAM

## 2018-09-27 PROCEDURE — 85027 COMPLETE CBC AUTOMATED: CPT | Performed by: PLASTIC SURGERY

## 2018-09-27 PROCEDURE — 36000059 ZZH SURGERY LEVEL 3 EA 15 ADDTL MIN UMMC: Performed by: PLASTIC SURGERY

## 2018-09-27 PROCEDURE — 40000171 ZZH STATISTIC PRE-PROCEDURE ASSESSMENT III: Performed by: PLASTIC SURGERY

## 2018-09-27 PROCEDURE — 25000565 ZZH ISOFLURANE, EA 15 MIN: Performed by: PLASTIC SURGERY

## 2018-09-27 PROCEDURE — 80048 BASIC METABOLIC PNL TOTAL CA: CPT | Performed by: NURSE PRACTITIONER

## 2018-09-27 PROCEDURE — 82565 ASSAY OF CREATININE: CPT | Performed by: STUDENT IN AN ORGANIZED HEALTH CARE EDUCATION/TRAINING PROGRAM

## 2018-09-27 PROCEDURE — 85027 COMPLETE CBC AUTOMATED: CPT | Performed by: NURSE PRACTITIONER

## 2018-09-27 PROCEDURE — 36000057 ZZH SURGERY LEVEL 3 1ST 30 MIN - UMMC: Performed by: PLASTIC SURGERY

## 2018-09-27 PROCEDURE — 40000275 ZZH STATISTIC RCP TIME EA 10 MIN

## 2018-09-27 PROCEDURE — C9290 INJ, BUPIVACAINE LIPOSOME: HCPCS | Performed by: STUDENT IN AN ORGANIZED HEALTH CARE EDUCATION/TRAINING PROGRAM

## 2018-09-27 PROCEDURE — 25000125 ZZHC RX 250: Performed by: NURSE ANESTHETIST, CERTIFIED REGISTERED

## 2018-09-27 PROCEDURE — 40000014 ZZH STATISTIC ARTERIAL MONITORING DAILY

## 2018-09-27 PROCEDURE — 36415 COLL VENOUS BLD VENIPUNCTURE: CPT | Performed by: STUDENT IN AN ORGANIZED HEALTH CARE EDUCATION/TRAINING PROGRAM

## 2018-09-27 PROCEDURE — 82962 GLUCOSE BLOOD TEST: CPT | Mod: 91

## 2018-09-27 PROCEDURE — 37000009 ZZH ANESTHESIA TECHNICAL FEE, EACH ADDTL 15 MIN: Performed by: PLASTIC SURGERY

## 2018-09-27 PROCEDURE — 25000128 H RX IP 250 OP 636: Performed by: ANESTHESIOLOGY

## 2018-09-27 PROCEDURE — 36415 COLL VENOUS BLD VENIPUNCTURE: CPT | Performed by: NURSE PRACTITIONER

## 2018-09-27 PROCEDURE — P9016 RBC LEUKOCYTES REDUCED: HCPCS | Performed by: NURSE PRACTITIONER

## 2018-09-27 PROCEDURE — 71000014 ZZH RECOVERY PHASE 1 LEVEL 2 FIRST HR: Performed by: PLASTIC SURGERY

## 2018-09-27 PROCEDURE — 25000128 H RX IP 250 OP 636: Performed by: PLASTIC SURGERY

## 2018-09-27 PROCEDURE — 85049 AUTOMATED PLATELET COUNT: CPT | Performed by: STUDENT IN AN ORGANIZED HEALTH CARE EDUCATION/TRAINING PROGRAM

## 2018-09-27 PROCEDURE — 88304 TISSUE EXAM BY PATHOLOGIST: CPT | Performed by: PLASTIC SURGERY

## 2018-09-27 PROCEDURE — 25000132 ZZH RX MED GY IP 250 OP 250 PS 637: Mod: GY | Performed by: STUDENT IN AN ORGANIZED HEALTH CARE EDUCATION/TRAINING PROGRAM

## 2018-09-27 PROCEDURE — 85610 PROTHROMBIN TIME: CPT | Performed by: NURSE PRACTITIONER

## 2018-09-27 PROCEDURE — 40000556 ZZH STATISTIC PERIPHERAL IV START W US GUIDANCE

## 2018-09-27 PROCEDURE — 37000008 ZZH ANESTHESIA TECHNICAL FEE, 1ST 30 MIN: Performed by: PLASTIC SURGERY

## 2018-09-27 RX ORDER — CARVEDILOL 12.5 MG/1
12.5 TABLET ORAL 2 TIMES DAILY WITH MEALS
Status: DISCONTINUED | OUTPATIENT
Start: 2018-09-27 | End: 2018-09-28 | Stop reason: HOSPADM

## 2018-09-27 RX ORDER — NICOTINE POLACRILEX 4 MG
15-30 LOZENGE BUCCAL
Status: DISCONTINUED | OUTPATIENT
Start: 2018-09-27 | End: 2018-09-28 | Stop reason: HOSPADM

## 2018-09-27 RX ORDER — GABAPENTIN 300 MG/1
300 CAPSULE ORAL ONCE
Status: DISCONTINUED | OUTPATIENT
Start: 2018-09-27 | End: 2018-09-27 | Stop reason: HOSPADM

## 2018-09-27 RX ORDER — HYDROMORPHONE HYDROCHLORIDE 1 MG/ML
.3-.5 INJECTION, SOLUTION INTRAMUSCULAR; INTRAVENOUS; SUBCUTANEOUS EVERY 10 MIN PRN
Status: DISCONTINUED | OUTPATIENT
Start: 2018-09-27 | End: 2018-09-27 | Stop reason: HOSPADM

## 2018-09-27 RX ORDER — DIPHENHYDRAMINE HCL 12.5MG/5ML
12.5 LIQUID (ML) ORAL EVERY 6 HOURS PRN
Status: DISCONTINUED | OUTPATIENT
Start: 2018-09-27 | End: 2018-09-28 | Stop reason: HOSPADM

## 2018-09-27 RX ORDER — BUPIVACAINE HYDROCHLORIDE 2.5 MG/ML
INJECTION, SOLUTION EPIDURAL; INFILTRATION; INTRACAUDAL PRN
Status: DISCONTINUED | OUTPATIENT
Start: 2018-09-27 | End: 2018-09-27

## 2018-09-27 RX ORDER — SODIUM CHLORIDE, SODIUM LACTATE, POTASSIUM CHLORIDE, CALCIUM CHLORIDE 600; 310; 30; 20 MG/100ML; MG/100ML; MG/100ML; MG/100ML
INJECTION, SOLUTION INTRAVENOUS CONTINUOUS
Status: DISCONTINUED | OUTPATIENT
Start: 2018-09-27 | End: 2018-09-27

## 2018-09-27 RX ORDER — OXYCODONE HYDROCHLORIDE 5 MG/1
5-10 TABLET ORAL EVERY 4 HOURS PRN
Status: DISCONTINUED | OUTPATIENT
Start: 2018-09-27 | End: 2018-09-28 | Stop reason: HOSPADM

## 2018-09-27 RX ORDER — AMOXICILLIN 250 MG
1 CAPSULE ORAL 2 TIMES DAILY
Status: DISCONTINUED | OUTPATIENT
Start: 2018-09-27 | End: 2018-09-28 | Stop reason: HOSPADM

## 2018-09-27 RX ORDER — FLUMAZENIL 0.1 MG/ML
0.2 INJECTION, SOLUTION INTRAVENOUS
Status: DISCONTINUED | OUTPATIENT
Start: 2018-09-27 | End: 2018-09-27 | Stop reason: HOSPADM

## 2018-09-27 RX ORDER — CEFAZOLIN SODIUM 1 G/50ML
3 SOLUTION INTRAVENOUS
Status: COMPLETED | OUTPATIENT
Start: 2018-09-27 | End: 2018-09-27

## 2018-09-27 RX ORDER — NITROGLYCERIN 0.4 MG/1
0.4 TABLET SUBLINGUAL EVERY 5 MIN PRN
Status: DISCONTINUED | OUTPATIENT
Start: 2018-09-27 | End: 2018-09-28 | Stop reason: HOSPADM

## 2018-09-27 RX ORDER — KETAMINE HYDROCHLORIDE 10 MG/ML
INJECTION, SOLUTION INTRAMUSCULAR; INTRAVENOUS PRN
Status: DISCONTINUED | OUTPATIENT
Start: 2018-09-27 | End: 2018-09-27

## 2018-09-27 RX ORDER — SODIUM CHLORIDE, SODIUM LACTATE, POTASSIUM CHLORIDE, CALCIUM CHLORIDE 600; 310; 30; 20 MG/100ML; MG/100ML; MG/100ML; MG/100ML
INJECTION, SOLUTION INTRAVENOUS CONTINUOUS
Status: DISCONTINUED | OUTPATIENT
Start: 2018-09-27 | End: 2018-09-27 | Stop reason: HOSPADM

## 2018-09-27 RX ORDER — AMOXICILLIN 250 MG
2 CAPSULE ORAL 2 TIMES DAILY
Status: DISCONTINUED | OUTPATIENT
Start: 2018-09-27 | End: 2018-09-28 | Stop reason: HOSPADM

## 2018-09-27 RX ORDER — ONDANSETRON 4 MG/1
4 TABLET, ORALLY DISINTEGRATING ORAL EVERY 6 HOURS PRN
Status: DISCONTINUED | OUTPATIENT
Start: 2018-09-27 | End: 2018-09-28 | Stop reason: HOSPADM

## 2018-09-27 RX ORDER — ONDANSETRON 2 MG/ML
4 INJECTION INTRAMUSCULAR; INTRAVENOUS EVERY 30 MIN PRN
Status: DISCONTINUED | OUTPATIENT
Start: 2018-09-27 | End: 2018-09-27

## 2018-09-27 RX ORDER — DEXAMETHASONE SODIUM PHOSPHATE 4 MG/ML
INJECTION, SOLUTION INTRA-ARTICULAR; INTRALESIONAL; INTRAMUSCULAR; INTRAVENOUS; SOFT TISSUE PRN
Status: DISCONTINUED | OUTPATIENT
Start: 2018-09-27 | End: 2018-09-27

## 2018-09-27 RX ORDER — MEPERIDINE HYDROCHLORIDE 50 MG/ML
12.5 INJECTION INTRAMUSCULAR; INTRAVENOUS; SUBCUTANEOUS
Status: DISCONTINUED | OUTPATIENT
Start: 2018-09-27 | End: 2018-09-27 | Stop reason: HOSPADM

## 2018-09-27 RX ORDER — LIDOCAINE 40 MG/G
CREAM TOPICAL
Status: DISCONTINUED | OUTPATIENT
Start: 2018-09-27 | End: 2018-09-27 | Stop reason: HOSPADM

## 2018-09-27 RX ORDER — FENTANYL CITRATE 50 UG/ML
25-50 INJECTION, SOLUTION INTRAMUSCULAR; INTRAVENOUS EVERY 5 MIN PRN
Status: DISCONTINUED | OUTPATIENT
Start: 2018-09-27 | End: 2018-09-27 | Stop reason: HOSPADM

## 2018-09-27 RX ORDER — ACETAMINOPHEN 325 MG/1
975 TABLET ORAL ONCE
Status: DISCONTINUED | OUTPATIENT
Start: 2018-09-27 | End: 2018-09-27 | Stop reason: HOSPADM

## 2018-09-27 RX ORDER — SIMVASTATIN 40 MG
80 TABLET ORAL AT BEDTIME
Status: DISCONTINUED | OUTPATIENT
Start: 2018-09-27 | End: 2018-09-28 | Stop reason: HOSPADM

## 2018-09-27 RX ORDER — ONDANSETRON 2 MG/ML
4 INJECTION INTRAMUSCULAR; INTRAVENOUS EVERY 6 HOURS PRN
Status: DISCONTINUED | OUTPATIENT
Start: 2018-09-27 | End: 2018-09-28 | Stop reason: HOSPADM

## 2018-09-27 RX ORDER — FENTANYL CITRATE 50 UG/ML
25-50 INJECTION, SOLUTION INTRAMUSCULAR; INTRAVENOUS EVERY 5 MIN PRN
Status: DISCONTINUED | OUTPATIENT
Start: 2018-09-27 | End: 2018-09-27

## 2018-09-27 RX ORDER — ONDANSETRON 4 MG/1
4 TABLET, ORALLY DISINTEGRATING ORAL EVERY 30 MIN PRN
Status: DISCONTINUED | OUTPATIENT
Start: 2018-09-27 | End: 2018-09-27

## 2018-09-27 RX ORDER — SODIUM CHLORIDE, SODIUM LACTATE, POTASSIUM CHLORIDE, CALCIUM CHLORIDE 600; 310; 30; 20 MG/100ML; MG/100ML; MG/100ML; MG/100ML
INJECTION, SOLUTION INTRAVENOUS CONTINUOUS PRN
Status: DISCONTINUED | OUTPATIENT
Start: 2018-09-27 | End: 2018-09-27

## 2018-09-27 RX ORDER — CEFAZOLIN SODIUM 1 G/3ML
1 INJECTION, POWDER, FOR SOLUTION INTRAMUSCULAR; INTRAVENOUS SEE ADMIN INSTRUCTIONS
Status: DISCONTINUED | OUTPATIENT
Start: 2018-09-27 | End: 2018-09-27 | Stop reason: HOSPADM

## 2018-09-27 RX ORDER — DIPHENHYDRAMINE HYDROCHLORIDE 50 MG/ML
12.5 INJECTION INTRAMUSCULAR; INTRAVENOUS EVERY 6 HOURS PRN
Status: DISCONTINUED | OUTPATIENT
Start: 2018-09-27 | End: 2018-09-28 | Stop reason: HOSPADM

## 2018-09-27 RX ORDER — SODIUM CHLORIDE 9 MG/ML
INJECTION, SOLUTION INTRAVENOUS CONTINUOUS
Status: DISCONTINUED | OUTPATIENT
Start: 2018-09-27 | End: 2018-09-28

## 2018-09-27 RX ORDER — ACETAMINOPHEN 325 MG/1
975 TABLET ORAL EVERY 8 HOURS
Status: DISCONTINUED | OUTPATIENT
Start: 2018-09-27 | End: 2018-09-28 | Stop reason: HOSPADM

## 2018-09-27 RX ORDER — DEXTROSE MONOHYDRATE 25 G/50ML
25-50 INJECTION, SOLUTION INTRAVENOUS
Status: DISCONTINUED | OUTPATIENT
Start: 2018-09-27 | End: 2018-09-28 | Stop reason: HOSPADM

## 2018-09-27 RX ORDER — LORATADINE 10 MG/1
10 TABLET ORAL DAILY
Status: DISCONTINUED | OUTPATIENT
Start: 2018-09-28 | End: 2018-09-28 | Stop reason: HOSPADM

## 2018-09-27 RX ORDER — ONDANSETRON 2 MG/ML
4 INJECTION INTRAMUSCULAR; INTRAVENOUS EVERY 30 MIN PRN
Status: DISCONTINUED | OUTPATIENT
Start: 2018-09-27 | End: 2018-09-27 | Stop reason: HOSPADM

## 2018-09-27 RX ORDER — HYDROMORPHONE HYDROCHLORIDE 1 MG/ML
.3-.5 INJECTION, SOLUTION INTRAMUSCULAR; INTRAVENOUS; SUBCUTANEOUS
Status: DISCONTINUED | OUTPATIENT
Start: 2018-09-27 | End: 2018-09-28 | Stop reason: HOSPADM

## 2018-09-27 RX ORDER — PROCHLORPERAZINE MALEATE 5 MG
5 TABLET ORAL EVERY 6 HOURS PRN
Status: DISCONTINUED | OUTPATIENT
Start: 2018-09-27 | End: 2018-09-28 | Stop reason: HOSPADM

## 2018-09-27 RX ORDER — HYDROMORPHONE HYDROCHLORIDE 1 MG/ML
.3-.5 INJECTION, SOLUTION INTRAMUSCULAR; INTRAVENOUS; SUBCUTANEOUS EVERY 5 MIN PRN
Status: DISCONTINUED | OUTPATIENT
Start: 2018-09-27 | End: 2018-09-27

## 2018-09-27 RX ORDER — NALOXONE HYDROCHLORIDE 0.4 MG/ML
.1-.4 INJECTION, SOLUTION INTRAMUSCULAR; INTRAVENOUS; SUBCUTANEOUS
Status: DISCONTINUED | OUTPATIENT
Start: 2018-09-27 | End: 2018-09-28 | Stop reason: HOSPADM

## 2018-09-27 RX ORDER — FENTANYL CITRATE 50 UG/ML
25-50 INJECTION, SOLUTION INTRAMUSCULAR; INTRAVENOUS
Status: DISCONTINUED | OUTPATIENT
Start: 2018-09-27 | End: 2018-09-27 | Stop reason: HOSPADM

## 2018-09-27 RX ORDER — GLYCOPYRROLATE 0.2 MG/ML
INJECTION, SOLUTION INTRAMUSCULAR; INTRAVENOUS PRN
Status: DISCONTINUED | OUTPATIENT
Start: 2018-09-27 | End: 2018-09-27

## 2018-09-27 RX ORDER — ACETAMINOPHEN 325 MG/1
650 TABLET ORAL EVERY 4 HOURS PRN
Status: DISCONTINUED | OUTPATIENT
Start: 2018-09-30 | End: 2018-09-28 | Stop reason: HOSPADM

## 2018-09-27 RX ORDER — LIDOCAINE 40 MG/G
CREAM TOPICAL
Status: DISCONTINUED | OUTPATIENT
Start: 2018-09-27 | End: 2018-09-28 | Stop reason: HOSPADM

## 2018-09-27 RX ORDER — NALOXONE HYDROCHLORIDE 0.4 MG/ML
.1-.4 INJECTION, SOLUTION INTRAMUSCULAR; INTRAVENOUS; SUBCUTANEOUS
Status: DISCONTINUED | OUTPATIENT
Start: 2018-09-27 | End: 2018-09-27 | Stop reason: HOSPADM

## 2018-09-27 RX ORDER — FENTANYL CITRATE 50 UG/ML
INJECTION, SOLUTION INTRAMUSCULAR; INTRAVENOUS PRN
Status: DISCONTINUED | OUTPATIENT
Start: 2018-09-27 | End: 2018-09-27

## 2018-09-27 RX ORDER — MAGNESIUM HYDROXIDE 1200 MG/15ML
LIQUID ORAL PRN
Status: DISCONTINUED | OUTPATIENT
Start: 2018-09-27 | End: 2018-09-27 | Stop reason: HOSPADM

## 2018-09-27 RX ORDER — LABETALOL HYDROCHLORIDE 5 MG/ML
10 INJECTION, SOLUTION INTRAVENOUS
Status: DISCONTINUED | OUTPATIENT
Start: 2018-09-27 | End: 2018-09-27 | Stop reason: HOSPADM

## 2018-09-27 RX ORDER — METHOCARBAMOL 750 MG/1
750 TABLET, FILM COATED ORAL EVERY 6 HOURS PRN
Status: DISCONTINUED | OUTPATIENT
Start: 2018-09-27 | End: 2018-09-28 | Stop reason: HOSPADM

## 2018-09-27 RX ORDER — ONDANSETRON 2 MG/ML
INJECTION INTRAMUSCULAR; INTRAVENOUS PRN
Status: DISCONTINUED | OUTPATIENT
Start: 2018-09-27 | End: 2018-09-27

## 2018-09-27 RX ORDER — PROPOFOL 10 MG/ML
INJECTION, EMULSION INTRAVENOUS PRN
Status: DISCONTINUED | OUTPATIENT
Start: 2018-09-27 | End: 2018-09-27

## 2018-09-27 RX ORDER — LIDOCAINE HYDROCHLORIDE 20 MG/ML
INJECTION, SOLUTION INFILTRATION; PERINEURAL PRN
Status: DISCONTINUED | OUTPATIENT
Start: 2018-09-27 | End: 2018-09-27

## 2018-09-27 RX ORDER — ONDANSETRON 4 MG/1
4 TABLET, ORALLY DISINTEGRATING ORAL EVERY 30 MIN PRN
Status: DISCONTINUED | OUTPATIENT
Start: 2018-09-27 | End: 2018-09-27 | Stop reason: HOSPADM

## 2018-09-27 RX ADMIN — MEPERIDINE HYDROCHLORIDE 12.5 MG: 50 INJECTION INTRAMUSCULAR; INTRAVENOUS; SUBCUTANEOUS at 20:30

## 2018-09-27 RX ADMIN — BUPIVACAINE 20 ML: 13.3 INJECTION, SUSPENSION, LIPOSOMAL INFILTRATION at 11:50

## 2018-09-27 RX ADMIN — ROCURONIUM BROMIDE 20 MG: 10 INJECTION INTRAVENOUS at 18:17

## 2018-09-27 RX ADMIN — SODIUM CHLORIDE, POTASSIUM CHLORIDE, SODIUM LACTATE AND CALCIUM CHLORIDE: 600; 310; 30; 20 INJECTION, SOLUTION INTRAVENOUS at 17:17

## 2018-09-27 RX ADMIN — SODIUM CHLORIDE: 9 INJECTION, SOLUTION INTRAVENOUS at 21:24

## 2018-09-27 RX ADMIN — SIMVASTATIN 80 MG: 40 TABLET, FILM COATED ORAL at 22:33

## 2018-09-27 RX ADMIN — BUPIVACAINE HYDROCHLORIDE 20 ML: 2.5 INJECTION, SOLUTION EPIDURAL; INFILTRATION; INTRACAUDAL; PERINEURAL at 14:50

## 2018-09-27 RX ADMIN — SENNOSIDES AND DOCUSATE SODIUM 2 TABLET: 8.6; 5 TABLET ORAL at 22:33

## 2018-09-27 RX ADMIN — FENTANYL CITRATE 50 MCG: 50 INJECTION, SOLUTION INTRAMUSCULAR; INTRAVENOUS at 17:40

## 2018-09-27 RX ADMIN — FENTANYL CITRATE 50 MCG: 50 INJECTION, SOLUTION INTRAMUSCULAR; INTRAVENOUS at 17:22

## 2018-09-27 RX ADMIN — Medication 3 G: at 17:17

## 2018-09-27 RX ADMIN — HYDROMORPHONE HYDROCHLORIDE 0.2 MG: 1 INJECTION, SOLUTION INTRAMUSCULAR; INTRAVENOUS; SUBCUTANEOUS at 19:12

## 2018-09-27 RX ADMIN — DEXAMETHASONE SODIUM PHOSPHATE 4 MG: 4 INJECTION, SOLUTION INTRA-ARTICULAR; INTRALESIONAL; INTRAMUSCULAR; INTRAVENOUS; SOFT TISSUE at 17:52

## 2018-09-27 RX ADMIN — SUGAMMADEX 200 MG: 100 INJECTION, SOLUTION INTRAVENOUS at 19:32

## 2018-09-27 RX ADMIN — ACETAMINOPHEN 975 MG: 325 TABLET, FILM COATED ORAL at 22:34

## 2018-09-27 RX ADMIN — FENTANYL CITRATE 50 MCG: 50 INJECTION, SOLUTION INTRAMUSCULAR; INTRAVENOUS at 17:05

## 2018-09-27 RX ADMIN — SODIUM CHLORIDE, POTASSIUM CHLORIDE, SODIUM LACTATE AND CALCIUM CHLORIDE: 600; 310; 30; 20 INJECTION, SOLUTION INTRAVENOUS at 16:51

## 2018-09-27 RX ADMIN — ENOXAPARIN SODIUM 40 MG: 40 INJECTION SUBCUTANEOUS at 12:04

## 2018-09-27 RX ADMIN — FENTANYL CITRATE 25 MCG: 50 INJECTION INTRAMUSCULAR; INTRAVENOUS at 21:02

## 2018-09-27 RX ADMIN — KETAMINE HYDROCHLORIDE 20 MG: 10 INJECTION, SOLUTION INTRAMUSCULAR; INTRAVENOUS at 17:05

## 2018-09-27 RX ADMIN — LIDOCAINE HYDROCHLORIDE 100 MG: 20 INJECTION, SOLUTION INFILTRATION; PERINEURAL at 17:05

## 2018-09-27 RX ADMIN — CARVEDILOL 12.5 MG: 12.5 TABLET, FILM COATED ORAL at 22:33

## 2018-09-27 RX ADMIN — PROPOFOL 100 MG: 10 INJECTION, EMULSION INTRAVENOUS at 17:05

## 2018-09-27 RX ADMIN — FENTANYL CITRATE 25 MCG: 50 INJECTION INTRAMUSCULAR; INTRAVENOUS at 20:40

## 2018-09-27 RX ADMIN — PROPOFOL 30 MG: 10 INJECTION, EMULSION INTRAVENOUS at 18:00

## 2018-09-27 RX ADMIN — ROCURONIUM BROMIDE 20 MG: 10 INJECTION INTRAVENOUS at 17:40

## 2018-09-27 RX ADMIN — SODIUM CHLORIDE 1000 ML: 900 IRRIGANT IRRIGATION at 19:10

## 2018-09-27 RX ADMIN — ROCURONIUM BROMIDE 50 MG: 10 INJECTION INTRAVENOUS at 17:05

## 2018-09-27 RX ADMIN — KETAMINE HYDROCHLORIDE 5 MG: 10 INJECTION, SOLUTION INTRAMUSCULAR; INTRAVENOUS at 19:00

## 2018-09-27 RX ADMIN — ONDANSETRON 4 MG: 2 INJECTION INTRAMUSCULAR; INTRAVENOUS at 18:56

## 2018-09-27 RX ADMIN — GLYCOPYRROLATE 0.2 MG: 0.2 INJECTION, SOLUTION INTRAMUSCULAR; INTRAVENOUS at 18:13

## 2018-09-27 RX ADMIN — FENTANYL CITRATE 50 MCG: 50 INJECTION INTRAMUSCULAR; INTRAVENOUS at 11:46

## 2018-09-27 RX ADMIN — KETAMINE HYDROCHLORIDE 10 MG: 10 INJECTION, SOLUTION INTRAMUSCULAR; INTRAVENOUS at 17:57

## 2018-09-27 RX ADMIN — FENTANYL CITRATE 50 MCG: 50 INJECTION INTRAMUSCULAR; INTRAVENOUS at 20:53

## 2018-09-27 RX ADMIN — Medication 1 G: at 19:16

## 2018-09-27 RX ADMIN — FENTANYL CITRATE 50 MCG: 50 INJECTION, SOLUTION INTRAMUSCULAR; INTRAVENOUS at 18:00

## 2018-09-27 ASSESSMENT — ACTIVITIES OF DAILY LIVING (ADL)
FALL_HISTORY_WITHIN_LAST_SIX_MONTHS: NO
TOILETING: 2-->ASSISTIVE PERSON
RETIRED_EATING: 0-->INDEPENDENT
DRESS: 0-->INDEPENDENT
BATHING: 2-->ASSISTIVE PERSON
RETIRED_COMMUNICATION: 0-->UNDERSTANDS/COMMUNICATES WITHOUT DIFFICULTY
COGNITION: 0 - NO COGNITION ISSUES REPORTED
AMBULATION: 1-->ASSISTIVE EQUIPMENT
TRANSFERRING: 2-->ASSISTIVE PERSON
SWALLOWING: 0-->SWALLOWS FOODS/LIQUIDS WITHOUT DIFFICULTY

## 2018-09-27 NOTE — IP AVS SNAPSHOT
Simin Huddleston #[id number] (CSN:[id number])  (68 year old F)  (Adm: 18)     [id number]               [address] [place] [address]: [phone]            Patient Demographics     Patient Name Sex          Age SSN Address Phone    Simin Huddleston Female 1950 (68 year old)  [address]   [facility] [id number] [phone] (Home) *Preferred*      Emergency Contact(s)     Name Relation Home Work Mobile    Stephen Arndt   [phone]      Admission Information     Attending Provider Admitting Provider Admission Type Admission Date/Time    AGUSTO Chaves MD Choudry, M Umar Hasan, MD Elective 18  0914    Discharge Date Hospital Service Auth/Cert Status Service Area     Plastic Surgery [facility]    Unit Room/Bed Admission Status        U5B [id number] Admission (Confirmed)       Admission     Complaint    Panniculitis, S/P panniculectomy      Hospital Account     Name Acct ID Class Status Primary Coverage    Simin Huddleston [id number] Observation Open MEDICARE - MEDICARE            Guarantor Account (for Hospital Account #[id number])     Name Relation to Pt Service Area Active? Acct Type    Simin Huddleston Self FCS Yes Personal/Family    Address Phone          [address] [place] [address] [phone](H)              Coverage Information (for Hospital Account #[id number])     F/O Payor/Plan Precert #    MEDICARE/MEDICARE     Subscriber Subscriber #    Simin Huddleston [id number]    Address Phone    ATTN CLAIMS  [address] [facility] [address]  [place] [address] [phone]                                                INTERAGENCY TRANSFER FORM - PHYSICIAN ORDERS   2018                       [address] [place] [address]: [phone]            Attending Provider: AGUSTO Chaves MD     Allergies:  Aspirin, Bitter Stop Flavor, Ether, Food, Latex, Metformin, Povidone Iodine, Seafood, Adhesive Tape, No Clinical  Screening - See Comments    Infection:  None   Service:  PLASTIC SURG    Ht:  1.219 m (4')   Wt:  112.9 kg (248 lb 14.4 oz)   Admission Wt:  112.9 kg (248 lb 14.4 oz)    BMI:  75.95 kg/m 2   BSA:  1.96 m 2            ED Clinical Impression     Diagnosis Description Comment Added By Time Added    Panniculitis [M79.3] Panniculitis [M79.3]  Velma Mcnamara MD 9/28/2018  7:08 AM    S/P panniculectomy [Z98.890] S/P panniculectomy [Z98.890]  Velma Mcnamara MD 9/28/2018  9:48 AM    Coronary artery disease involving native heart with angina pectoris, unspecified vessel or lesion type (H) [I25.119] Coronary artery disease involving native heart with angina pectoris, unspecified vessel or lesion type (H) [I25.119]  Velma Mcnamara MD 9/28/2018  9:49 AM    H/O deep venous thrombosis [Z86.718] H/O deep venous thrombosis [Z86.718]  Velma Mcnamara MD 9/28/2018  9:53 AM    Type 2 diabetes mellitus with complication, without long-term current use of insulin (H) [E11.8] Type 2 diabetes mellitus with complication, without long-term current use of insulin (H) [E11.8]  Velma Mcnamara MD 9/28/2018  9:53 AM    Seasonal allergic rhinitis, unspecified chronicity, unspecified trigger [J30.2] Seasonal allergic rhinitis, unspecified chronicity, unspecified trigger [J30.2]  Velma Mcnamara MD 9/28/2018  9:54 AM    Benign essential hypertension [I10] Benign essential hypertension [I10]  Velma Mcnamara MD 9/28/2018  9:55 AM    Arthritis [M19.90] Arthritis [M19.90]  Velma Mcnamara MD 9/28/2018  9:55 AM    Iron deficiency anemia, unspecified iron deficiency anemia type [D50.9] Iron deficiency anemia, unspecified iron deficiency anemia type [D50.9]  Velma Mcnamara MD 9/28/2018  9:56 AM      Hospital Problems as of 9/28/2018              Priority Class Noted POA    S/P panniculectomy Medium  9/28/2018 Yes      Non-Hospital Problems as of 9/28/2018              Priority Class Noted    Panniculitis Medium   2/21/2018      Code Status History     This patient does not have a recorded code status. Please follow your organizational policy for patients in this situation.      Current Code Status     This patient does not have a recorded code status. Please follow your organizational policy for patients in this situation.      Summary of Visit     Reason for your hospital stay       Panniculectomy                Medication Review      START taking        Dose / Directions Comments    enoxaparin 40 MG/0.4ML injection   Commonly known as:  LOVENOX        Dose:  40 mg   Inject 0.4 mLs (40 mg) Subcutaneous every 24 hours Continue until warfarin is therapeutic, and then stop.   Quantity:  14 Syringe   Refills:  0        oxyCODONE IR 5 MG tablet   Commonly known as:  ROXICODONE   Used for:  Panniculitis        Dose:  2.5-5 mg   Take 0.5-1 tablets (2.5-5 mg) by mouth every 4 hours as needed for severe pain   Quantity:  10 tablet   Refills:  0        senna-docusate 8.6-50 MG per tablet   Commonly known as:  SENOKOT-S;PERICOLACE        Dose:  1 tablet   Take 1 tablet by mouth 2 times daily While taking narcotic pain medication. Hold for loose stools.   Quantity:  100 tablet   Refills:  0          CONTINUE these medications which may have CHANGED, or have new prescriptions. If we are uncertain of the size of tablets/capsules you have at home, strength may be listed as something that might have changed.        Dose / Directions Comments    acetaminophen 325 MG tablet   Commonly known as:  TYLENOL   This may have changed:    - how much to take  - when to take this  - reasons to take this  - additional instructions        Dose:  975 mg   Take 3 tablets (975 mg) by mouth every 8 hours as needed for pain Do not take more than 4,000 mg of acetaminophen (Tylenol) daily from all sources to prevent liver damage.   Quantity:  100 tablet   Refills:  1        iron 325 (65 Fe) MG tablet   This may have changed:  how much to take   Used for:  Iron  deficiency anemia, unspecified iron deficiency anemia type        Dose:  1 tablet   Take 1 tablet by mouth daily (with breakfast)   Quantity:  30 tablet   Refills:  0        warfarin 4 MG tablet   Commonly known as:  COUMADIN   This may have changed:    - additional instructions  - These instructions start on 10/1/2018. If you are unsure what to do until then, ask your doctor or other care provider.   Used for:  H/O deep venous thrombosis        Dose:  4 mg   Start taking on:  10/1/2018   Take 1 tablet (4 mg) by mouth every evening Restart 10/1/2018 after surgery   Quantity:  30 tablet   Refills:  0          CONTINUE these medications which have NOT CHANGED        Dose / Directions Comments    aspirin 81 MG tablet   Used for:  Coronary artery disease involving native heart with angina pectoris, unspecified vessel or lesion type (H)        Dose:  81 mg   Take 1 tablet (81 mg) by mouth daily   Quantity:  30 tablet   Refills:  0        bisacodyl 10 MG Suppository   Commonly known as:  DULCOLAX        Dose:  10 mg   Place 10 mg rectally daily as needed for constipation   Refills:  0        Carboxymethylcellulose Sod PF 0.5 % Soln ophthalmic solution   Commonly known as:  REFRESH PLUS        Dose:  1 drop   Place 1 drop into both eyes 4 times daily as needed for dry eyes   Refills:  0        carvedilol 12.5 MG tablet   Commonly known as:  COREG   Used for:  Coronary artery disease involving native heart with angina pectoris, unspecified vessel or lesion type (H)        Dose:  12.5 mg   Take 1 tablet (12.5 mg) by mouth 2 times daily (with meals)   Quantity:  60 tablet   Refills:  0        diclofenac 1 % Gel topical gel   Commonly known as:  VOLTAREN   Used for:  Arthritis        Dose:  4 g   Place 4 g onto the skin 4 times daily   Refills:  0        diphenhydrAMINE-acetaminophen  MG tablet   Commonly known as:  TYLENOL PM        Dose:  1 tablet   Take 1 tablet by mouth nightly as needed for sleep   Refills:  0         eucerin cream        Apply topically as needed for dry skin   Refills:  0        furosemide 20 MG tablet   Commonly known as:  LASIX   Used for:  Coronary artery disease involving native heart with angina pectoris, unspecified vessel or lesion type (H)        Dose:  20 mg   Take 1 tablet (20 mg) by mouth daily   Quantity:  30 tablet   Refills:  0        glipiZIDE 10 MG 24 hr tablet   Commonly known as:  GLUCOTROL XL   Used for:  Type 2 diabetes mellitus with complication, without long-term current use of insulin (H)        Dose:  10 mg   Take 1 tablet (10 mg) by mouth daily (with breakfast)   Quantity:  30 tablet   Refills:  0        hydrALAZINE 50 MG tablet   Commonly known as:  APRESOLINE   Used for:  Benign essential hypertension        Dose:  50 mg   Take 1 tablet (50 mg) by mouth 2 times daily   Quantity:  60 tablet   Refills:  0        isosorbide mononitrate 30 MG 24 hr tablet   Commonly known as:  IMDUR   Used for:  Coronary artery disease involving native heart with angina pectoris, unspecified vessel or lesion type (H)        Dose:  30 mg   Take 1 tablet (30 mg) by mouth daily   Quantity:  30 tablet   Refills:  0        loratadine 10 MG tablet   Commonly known as:  CLARITIN   Used for:  Seasonal allergic rhinitis, unspecified chronicity, unspecified trigger        Dose:  10 mg   Take 1 tablet (10 mg) by mouth daily   Quantity:  30 tablet   Refills:  0        magnesium hydroxide 400 MG/5ML suspension   Commonly known as:  MILK OF MAGNESIA        Dose:  30 mL   Take 30 mLs by mouth daily as needed for constipation   Refills:  0        nitroGLYcerin 0.4 MG sublingual tablet   Commonly known as:  NITROSTAT        Dose:  0.4 mg   Place 0.4 mg under the tongue every 5 minutes as needed for chest pain For chest pain place 1 tablet under the tongue every 5 minutes for 3 doses. If symptoms persist 5 minutes after 1st dose call 911.   Refills:  0        pioglitazone 45 MG tablet   Commonly known as:  ACTOS   Used  for:  Type 2 diabetes mellitus with complication, without long-term current use of insulin (H)        Dose:  45 mg   Take 1 tablet (45 mg) by mouth daily   Quantity:  30 tablet   Refills:  0        Salicylic Acid-Urea 5-10 % Oint   Commonly known as:  KERASAL   Used for:  Arthritis        Externally apply topically as needed   Refills:  0        simvastatin 80 MG tablet   Commonly known as:  ZOCOR   Used for:  Coronary artery disease involving native heart with angina pectoris, unspecified vessel or lesion type (H)        Dose:  80 mg   Take 1 tablet (80 mg) by mouth At Bedtime   Quantity:  30 tablet   Refills:  0        sodium chloride 5 % ophthalmic ointment   Commonly known as:  MICHELLE 128        Dose:  1 Application   Place 1 Application into both eyes At Bedtime   Refills:  0        spironolactone 25 MG tablet   Commonly known as:  ALDACTONE   Used for:  Coronary artery disease involving native heart with angina pectoris, unspecified vessel or lesion type (H)        Dose:  25 mg   Take 1 tablet (25 mg) by mouth daily   Quantity:  30 tablet   Refills:  0                After Care     Activity - Up ad kat       No lifting > 5 pounds for 6 weeks from surgery.  Minimize bending and twisting.       Additional Discharge Instructions       Warfarin ok to restart on 10/1/2018.  Lovenox 40mg daily to continue until INR is therapeutic, then may stop.    Incentive spirometry should be performed 10x per hour while awake for at least 1 week after surgery.       Advance Diet as Tolerated       Follow this diet upon discharge:   Moderate Consistent CHO Diet       Daily weights       Call Provider for weight gain of more than 2 pounds per day or 5 pounds per week.       Drain care       Strip bilateral DERICK drains BID. Record output daily and send records to follow up appointments.       Fall precautions           General info for SNF       Length of Stay Estimate: Short Term Care: Estimated # of Days <30  Condition at Discharge:  Stable  Level of care:skilled   Rehabilitation Potential: Fair  Admission H&P remains valid and up-to-date: Yes  Recent Chemotherapy: N/A  Use Nursing Home Standing Orders: Yes       Glucose monitor nursing POCT       Before meals and at bedtime       Intake and output       Every shift       Mantoux instructions       Give two-step Mantoux (PPD) Per Facility Policy Yes       Wound care (specify)       Site:   Abdomen  Instructions:  No special dressings needed, but may cover with gauze or ABD if drainage noted from incisions. Incisions are covered with Dermabond Prineo, which will peel off on its own over 7-14 days.     Wear abdominal binder as much as possible.             Procedures     Oxygen - Nasal cannula       2 Lpm by nasal cannula to keep O2 sats 92% or greater.             Lab Orders     Basic metabolic panel           Basic metabolic panel           CBC with platelets       Last Lab Result: Hemoglobin (g/dL)       Date                     Value                 09/28/2018               8.0 (L)          ----------             Supplies     Abdominal dressing lópez/binder           Pneumatic Compression Device        Bilateral calf. Remove 30 mins BID. Ok to remove when ambulating.             Referrals     Occupational Therapy Adult Consult       Evaluate and treat as clinically indicated.    Reason:  Postop deconditioning       Physical Therapy Adult Consult       Evaluate and treat as clinically indicated.    Reason:  Postop deconditioning             Follow-Up Appointment Instructions     Follow Up and recommended labs and tests       Recommend BMP recheck in 2 days (ordered - results should be discussed with patient's primary care provider).    Follow up with primary care provider in 1 week. Recommend Hemoglobin and BMP recheck. Consider outpatient sleep study as patient desaturated during sleep while inpatient, defer to primary care provider.    Follow up with Dr. Chaves as previously scheduled.              Your next 10 appointments already scheduled     Oct 03, 2018 11:30 AM CDT   (Arrive by 11:15 AM)   Post-Op with AGUSTO Chaves MD   [facility] Plastic and Reconstructive Surgery ([facility] [place])    [address] [facility]  [address] [facility] [address] [phone]              Statement of Approval     Ordered          09/28/18 1135  I have reviewed and agree with all the recommendations and orders detailed in this document.  EFFECTIVE NOW     Approved and electronically signed by:  Velma Mcnamara MD                                                 INTERAGENCY TRANSFER FORM - NURSING   9/27/2018                       [address] [facility] BANK: [phone]            Attending Provider: AGUSTO Chaves MD     Allergies:  Aspirin, Bitter Stop Flavor, Ether, Food, Latex, Metformin, Povidone Iodine, Seafood, Adhesive Tape, No Clinical Screening - See Comments    Infection:  None   Service:  PLASTIC SURG    Ht:  1.219 m (4')   Wt:  112.9 kg (248 lb 14.4 oz)   Admission Wt:  112.9 kg (248 lb 14.4 oz)    BMI:  75.95 kg/m 2   BSA:  1.96 m 2            Advance Directives        Scanned docmt in ACP Activity?           No scanned doc        Immunizations     Name Date      Influenza (High Dose) 3 valent vaccine 09/28/18       ASSESSMENT     Discharge Profile Flowsheet     GASTROINTESTINAL (ADULT,PEDIATRIC,OB)     Inspection under devices  Full 09/28/18 1016    GI WDL  ex 09/28/18 1016   Skin WDL  ex 09/28/18 1016    Abdominal Appearance  obese 09/28/18 1016   Skin Color/Characteristics  pale 09/28/18 1016    Last Bowel Movement  09/26/18 09/27/18 2255   Skin Temperature  warm 09/28/18 1016    GI Signs/Symptoms  abdominal discomfort 09/28/18 1016   Skin Moisture  dry 09/28/18 1016    Passing flatus  yes 09/28/18 1016   Skin Integrity  bruise(s);drain/device(s);incision(s);scar(s) 09/28/18 1016    COMMUNICATION ASSESSMENT     Additional Documentation  -- (brusing throughout  "body) 09/27/18 1806    Patient's communication style  spoken language (English or Bilingual) 09/27/18 2208   SAFETY      SKIN     Safety WDL  WDL 09/28/18 1016    Inspection of bony prominences  Full except (identify areas not inspected) 09/28/18 1016   Safety Factors  bed in position other than low;call light in reach 09/27/18 2115    Full except areas not inspected   Buttock, left;Buttock, right;Sacrum;Coccyx;Spine;Hip, left;Hip, right 09/28/18 1016   All Alarms  alarm(s) activated and audible 09/28/18 1016                 Assessment WDL (Within Defined Limits) Definitions           Safety WDL     Effective: 09/28/15    Row Information: <b>WDL Definition:</b> Bed in low position, wheels locked; call light in reach; upper side rails up x 2; ID band on<br> <font color=\"gray\"><i>Item=AS safety wdl>>List=AS safety wdl>>Version=F14</i></font>      Skin WDL     Effective: 09/28/15    Row Information: <b>WDL Definition:</b> Warm; dry; intact; elastic; without discoloration; pressure points without redness<br> <font color=\"gray\"><i>Item=AS skin wdl>>List=AS skin wdl>>Version=F14</i></font>      Vitals     Vital Signs Flowsheet     VITAL SIGNS     Pain Control  partially effective 09/27/18 2326    Temp  98.9  F (37.2  C) 09/28/18 1410   Functioning  can do most things, but pain gets in the way of some 09/27/18 2326    Temp src  Oral 09/28/18 1410   HEIGHT AND WEIGHT      Resp  18 09/28/18 1410   Height  1.219 m (4') 09/27/18 0945    Pulse  73 09/27/18 2148   Weight  112.9 kg (248 lb 14.4 oz) 09/27/18 0945    Heart Rate  66 09/28/18 1410   BSA (Calculated - sq m)  1.96 09/27/18 0945    Pulse/Heart Rate Source  Monitor 09/28/18 1410   BMI (Calculated)  76.11 09/27/18 0945    BP  125/47 09/28/18 1410   POSITIONING      BP Location  Left arm 09/28/18 1410   Body Position  independently positioning 09/28/18 1016    Patient Position  Sitting 09/27/18 0943   Head of Bed (HOB)  HOB at 30-45 degrees 09/28/18 1016    OXYGEN THERAPY  "    ECG      SpO2  93 % 09/28/18 1410   ECG Rhythm  Normal sinus rhythm 09/27/18 2010    O2 Device  None (Room air) 09/28/18 1410   Ectopy  None 09/27/18 1531    Oxygen Delivery  4 LPM 09/27/18 2148   Lead Monitored  Lead II;V 5 09/27/18 1412    RESPIRATORY MONITORING     ANALGESIA SIDE EFFECTS MONITORING      Respiratory Monitoring (EtCO2)  24 mmHg 09/27/18 2108   Side Effects Monitoring: Respiratory Quality  R 09/27/18 2326    PAIN/COMFORT     Side Effects Monitoring: Respiratory Depth  N 09/27/18 2326    Patient Currently in Pain  denies 09/28/18 0411   Side Effects Monitoring: Sedation Level  1 09/27/18 2326    Preferred Pain Scale  CAPA (Clinically Aligned Pain Assessment) ([facility] Adults Only) 09/28/18 0411   DAILY CARE      CLINICALLY ALIGNED PAIN ASSESSMENT (CAPA) ([facility] ADULTS ONLY)     Activity Management  activity adjusted per tolerance 09/28/18 1016    Comfort  tolerable with discomfort 09/27/18 2326   Activity Assistance Provided  assistance, 1 person 09/28/18 1016    Change in Pain  getting better 09/27/18 2326                 Patient Lines/Drains/Airways Status    Active LINES/DRAINS/AIRWAYS     Name: Placement date: Placement time: Site: Days: Last dressing change:    Closed/Suction Drain Left;Lateral Abdomen Bulb 15 Nepali 09/27/18   1906   Abdomen   less than 1     Closed/Suction Drain Right;Lateral Abdomen Bulb 15 Nepali 09/27/18   1906   Abdomen   less than 1     Peripheral IV 09/27/18 Right;Anterior Lower forearm 09/27/18   1031   Lower forearm   1     Peripheral IV 09/27/18 Left Lower forearm 09/27/18   1717   Lower forearm   less than 1     Incision/Surgical Site 09/27/18 Lower;Transverse Abdomen 09/27/18   1910    less than 1             Patient Lines/Drains/Airways Status    Active PICC/CVC     None            Intake/Output Detail Report     Date Intake   Output   Net    Shift I.V. IV Piggyback Total Urine Drains Total       Day 09/27/18 0000 - 09/27/18  0659 -- -- -- -- -- -- 0    Monik 09/27/18 0700 - 09/27/18 1459 -- -- -- -- -- -- 0    Noc 09/27/18 1500 - 09/27/18 2359 1200 -- 1200 -- 80 80 1120    Day 09/28/18 0000 - 09/28/18 0659 -- -- -- 300 -- 300 -300    Monik 09/28/18 0700 - 09/28/18 1459 -- -- -- -- 70 70 -70      Case Management/Discharge Planning     Case Management/Discharge Planning Flowsheet     LIVING ENVIRONMENT     QUESTION TO PATIENT:  Has a member of your family or a partner(now or in the past) intimidated, hurt, manipulated, or controlled you in any way?  no 09/27/18 1039    Lives With  alone;facility resident 09/25/18 1307   QUESTION TO PATIENT: Do you feel safe going back to the place where you are living?  yes 09/27/18 1039    COPING/STRESS     OBSERVATION: Is there reason to believe there has been maltreatment of a vulnerable adult (ie. Physical/Sexual/Emotional abuse, self neglect, lack of adequate food, shelter, medical care, or financial exploitation)?  no 09/27/18 1039    Major Change/Loss/Stressor  none 09/27/18 2219   OTHER      ABUSE RISK SCREEN     Are you depressed or being treated for depression?  No 09/27/18 2225                  UNIT [address] [facility] BANK: [phone]            Medication Administration Report for Simin Huddleston as of 09/28/18 1420   Legend:    Given Hold Not Given Due Canceled Entry Other Actions    Time Time (Time) Time  Time-Action       Inactive    Active    Linked        Medications 09/22/18 09/23/18 09/24/18 09/25/18 09/26/18 09/27/18 09/28/18    acetaminophen (TYLENOL) tablet 975 mg  Dose: 975 mg  Freq: EVERY 8 HOURS Route: PO  Start: 09/27/18 2145   End: 09/30/18 2144   Admin Instructions: Do not use if patient has an active opioid/acetaminophen combined analgesic product ordered for pain.  Maximum acetaminophen dose from all sources = 75 mg/kg/day not to exceed 4 grams/day.    Admin. Amount: 3 tablet (3 × 325 mg tablet)  Last Admin: 09/28/18 0557  Dispense Loc: [facility] ADS 5B2  Administrations Remaining:  7  POC: Post-procedure          2234 (975 mg)-Given        0557 (975 mg)-Given       [ ] 1345       [ ] 2145           aspirin chewable tablet 81 mg  Dose: 81 mg  Freq: DAILY Route: PO  Start: 09/28/18 1000   Admin. Amount: 1 tablet (1 × 81 mg tablet)  Last Admin: 09/28/18 1142  Dispense Loc: [facility] ADS 5B2           1142 (81 mg)-Given           carvedilol (COREG) tablet 12.5 mg  Dose: 12.5 mg  Freq: 2 TIMES DAILY WITH MEALS Route: PO  Start: 09/27/18 2145   Admin. Amount: 1 tablet (1 × 12.5 mg tablet)  Last Admin: 09/28/18 0833  Dispense Loc: [facility] ADS 5B2          2233 (12.5 mg)-Given        0833 (12.5 mg)-Given       [ ] 1800           diphenhydrAMINE (BENADRYL) solution 12.5 mg  Dose: 12.5 mg  Freq: EVERY 6 HOURS PRN Route: PO  PRN Reason: itching  Start: 09/27/18 2140   Admin Instructions: Caution to be used when administering multiple CNS depressing meds within a short time frame.    Admin. Amount: 12.5 mg = 5 mL Conc: 2.5 mg/mL  Dispense Loc: [facility]  Volume: 5 mL  POC: Post-procedure              Or  diphenhydrAMINE (BENADRYL) injection 12.5 mg  Dose: 12.5 mg  Freq: EVERY 6 HOURS PRN Route: IV  PRN Reason: itching  PRN Comment: Only give if patient unable to take PO.  Start: 09/27/18 2140   Admin Instructions: Caution to be used when administering multiple CNS depressing meds within a short time frame.  For ordered IV doses 1-50 mg, give IV Push undiluted. Give each 25mg over a minimum of 1 minute. Extend in non-emergency    Admin. Amount: 12.5 mg = 0.25 mL Conc: 50 mg/mL  Dispense Loc: [facility] ADS 5B2  Volume: 1 mL  POC: Post-procedure               enoxaparin (LOVENOX) injection 40 mg  Dose: 40 mg  Freq: EVERY 24 HOURS Route: SC  Start: 09/28/18 1500   Admin Instructions: Check to make sure start date/time is 12-24 hours post op unless documented complication, AND no sooner than 22 hours post op if spinal anesthesia used.   Continue until discharge to home. HOLD if platelet count falls  below 50% of baseline or less than 100,000/ L and notify provider.    Admin. Amount: 40 mg = 0.4 mL Conc: 40 mg/0.4 mL  Dispense Loc: [facility] ADS 5B2  Volume: 0.4 mL  POC: Post-procedure           [ ] 1500           furosemide (LASIX) tablet 20 mg  Dose: 20 mg  Freq: DAILY Route: PO  Start: 09/28/18 1000   Admin. Amount: 1 tablet (1 × 20 mg tablet)  Last Admin: 09/28/18 1143  Dispense Loc: [facility] ADS 5B2           1143 (20 mg)-Given           glucose gel 15-30 g  Dose: 15-30 g  Freq: EVERY 15 MIN PRN Route: PO  PRN Reason: low blood sugar  Start: 09/27/18 2141   Admin Instructions: Give 15 g for BG 51 to 69 mg/dL IF patient is conscious and able to swallow. Give 30 g for BG less than or equal to 50 mg/dL IF patient is conscious and able to swallow. Do NOT give glucose gel via enteral tube.  IF patient has enteral tube: give apple juice 120 mL (4 oz or 15 g of CHO) via enteral tube for BG 51 to 69 mg/dL.  Give apple juice 240 mL (8 oz or 30 g of CHO) via enteral tube for BG less than or equal to 50 mg/dL.    ~Oral gel is preferable for conscious and able to swallow patient.   ~IF gel unavailable or patient refuses may provide apple juice 120 mL (4 oz or 15 g of CHO). Document juice on I and O flowsheet.    Admin. Amount: 15-30 g  Dispense Loc: [facility] ADS 5B2  Volume: 93.75 mL              Or  dextrose 50 % injection 25-50 mL  Dose: 25-50 mL  Freq: EVERY 15 MIN PRN Route: IV  PRN Reason: low blood sugar  Start: 09/27/18 2141   Admin Instructions: Use if have IV access, BG less than 70 mg/dL and meet dose criteria below:  Dose if conscious and alert (or disorientated) and NPO = 25 mL  Dose if unconscious / not alert = 50 mL  Vesicant. For ordered doses up to 25 g, give IV Push undiluted. Give each 5g over 1 minute.    Admin. Amount: 25-50 mL  Dispense Loc: [facility] ADS 5B2  Infused Over: 1-5 Minutes  Volume: 50 mL              Or  glucagon injection 1 mg  Dose: 1 mg  Freq: EVERY 15 MIN PRN Route: SC  PRN Reason: low  blood sugar  PRN Comment: May repeat x 1 only  Start: 09/27/18 2141   Admin Instructions: May give SQ or IM. ONLY use glucagon IF patient has NO IV access AND is UNABLE to swallow AND blood glucose is LESS than or EQUAL to 50 mg/dL.  If ordered IV, give IV Push over 1 minute. Reconstitute with 1mL sterile water.    Admin. Amount: 1 mg  Dispense Loc: [facility] ADS 5B2               hydrALAZINE (APRESOLINE) tablet 50 mg  Dose: 50 mg  Freq: 2 TIMES DAILY Route: PO  Start: 09/28/18 1000   Admin. Amount: 1 tablet (1 × 50 mg tablet)  Last Admin: 09/28/18 1142  Dispense Loc: [facility] ADS 5B2           1142 (50 mg)-Given       [ ] 2000           HYDROmorphone (PF) (DILAUDID) injection 0.3-0.5 mg  Dose: 0.3-0.5 mg  Freq: EVERY 2 HOURS PRN Route: IV  PRN Reason: other  PRN Comment: pain control or improvement in physical function. Hold dose for analgesic side effects.  Start: 09/27/18 2140   Admin Instructions: Start at the lowest dose.  May adjust dose by 0.1 mg every 2 hours as needed.   Notify provider to assess for uncontrolled pain or analgesic side effects.  Hold while on IV PCA or with regular IV opioid dosing.  For ordered IV doses 0.1-4 mg give IV Push undiluted. Administer each 2mg over 2-5 minutes.    Admin. Amount: 0.3-0.5 mg  Dispense Loc: [facility] ADS 5B2  POC: Post-procedure               insulin aspart (NovoLOG) inj (RAPID ACTING)  Dose: 1-5 Units  Freq: AT BEDTIME Route: SC  Start: 09/27/18 2200   Admin Instructions: MEDIUM INSULIN RESISTANCE DOSING    Do Not give Bedtime Correction Insulin if BG less than  200.   For  - 249 give 1 units.   For  - 299 give 2 units.   For  - 349 give 3 units.   For  -399 give 4 units.   For BG greater than or equal to 400 give 5 units.  Notify provider if glucose greater than or equal to 350 mg/dL after administration of correction dose.  If given at mealtime, administer within 30 minutes of start of meal    Admin. Amount: 1-5 Units  Dispense Loc: Contact  "Rx for dose  Volume: 3 mL          (2232)-Not Given [C]        [ ] 2200           insulin aspart (NovoLOG) inj (RAPID ACTING)  Dose: 1-7 Units  Freq: 3 TIMES DAILY BEFORE MEALS Route: SC  Start: 09/28/18 0730   Admin Instructions: Correction Scale - MEDIUM INSULIN RESISTANCE DOSING     Do Not give Correction Insulin if Pre-Meal BG less than 140.   For Pre-Meal  - 189 give 1 unit.   For Pre-Meal  - 239 give 2 units.   For Pre-Meal  - 289 give 3 units.   For Pre-Meal  - 339 give 4 units.   For Pre-Meal - 399 give 5 units.   For Pre-Meal -449 give 6 units  For Pre-Meal BG greater than or equal to 450 give 7 units.   To be given with prandial insulin, and based on pre-meal blood glucose.    Notify provider if glucose greater than or equal to 350 mg/dL after administration of correction dose.  If given at mealtime, administer within 30 minutes of start of meal    Admin. Amount: 1-7 Units  Last Admin: 09/28/18 0833  Dispense Loc: Contact Rx for dose  Volume: 3 mL           0833 (1 Units)-Given       (1152)-Not Given       [ ] 1700           isosorbide mononitrate (IMDUR) 24 hr tablet 30 mg  Dose: 30 mg  Freq: DAILY Route: PO  Start: 09/28/18 1000   Admin Instructions: DO NOT CRUSH. Can split tablet in half along score norm.    Admin. Amount: 1 tablet (1 × 30 mg tablet)  Last Admin: 09/28/18 1143  Dispense Loc: [facility]           1143 (30 mg)-Given           lidocaine (LMX4) cream  Freq: EVERY 1 HOUR PRN Route: Top  PRN Reason: pain  PRN Comment: with VAD insertion or accessing implanted port.  Start: 09/27/18 2140   Admin Instructions: Do NOT give if patient has a history of allergy to any local anesthetic or any \"nickie\" product.   Apply 30 minutes prior to VAD insertion or port access.  MAX Dose:  2.5 g (  of 5 g tube)    Dispense Loc: [facility] ADS 5B2  POC: Post-procedure               lidocaine 1 % 1 mL  Dose: 1 mL  Freq: EVERY 1 HOUR PRN Route: OTHER  PRN Comment: mild " "pain with VAD insertion or accessing implanted port  Start: 09/27/18 2140   Admin Instructions: Do NOT give if patient has a history of allergy to any local anesthetic or any \"nickie\" product. MAX dose 1 mL subcutaneous OR intradermal in divided doses.    Admin. Amount: 1 mL  Dispense Loc: [facility] ADS 5B2  Volume: 2 mL  POC: Post-procedure               loratadine (CLARITIN) tablet 10 mg  Dose: 10 mg  Freq: DAILY Route: PO  Start: 09/28/18 0800   Admin. Amount: 1 tablet (1 × 10 mg tablet)  Last Admin: 09/28/18 0833  Dispense Loc: [facility] ADS 5B2           0833 (10 mg)-Given           methocarbamol (ROBAXIN) tablet 750 mg  Dose: 750 mg  Freq: EVERY 6 HOURS PRN Route: PO  PRN Reason: muscle spasms  Start: 09/27/18 2140   Admin. Amount: 1 tablet (1 × 750 mg tablet)  Dispense Loc: [facility]  POC: Post-procedure               naloxone (NARCAN) injection 0.1-0.4 mg  Dose: 0.1-0.4 mg  Freq: EVERY 2 MIN PRN Route: IV  PRN Reason: opioid reversal  Start: 09/27/18 2140   Admin Instructions: For respiratory rate LESS than or EQUAL to 8.  Partial reversal dose:  0.1 mg titrated q 2 minutes for Analgesia Side Effects Monitoring Sedation Level of 3 (frequently drowsy, arousable, drifts to sleep during conversation).Full reversal dose:  0.4 mg bolus for Analgesia Side Effects Monitoring Sedation Level of 4 (somnolent, minimal or no response to stimulation).  For ordered IV doses 0.1-2mg give IVP. Give each 0.4mg over 15 seconds in emergency situations. For non-emergent situations further dilute in 9mL of NS to facilitate titration of response.    Admin. Amount: 0.1-0.4 mg = 0.25-1 mL Conc: 0.4 mg/mL  Dispense Loc: [facility] ADS 5B2  Volume: 1 mL  POC: Post-procedure               naloxone (NARCAN) injection 0.1-0.4 mg  Dose: 0.1-0.4 mg  Freq: EVERY 2 MIN PRN Route: IV  PRN Reason: opioid reversal  Start: 09/27/18 2140   End: 09/28/18 2139   Admin Instructions: For apnea or imminent respiratory arrest: give 0.4 mg IV " undiluted Q 2 minutes PRN until desired degree of reversal is obtained, stop opioid and notify provider. Continue monitoring until discharge criteria are met for a minimum of 2 hours.  For severe sedation, decrease in respiratory depth, quality or respiratory rate less than 8: give 0.1 mg IV Q 2 minutes x 3 doses, stop opioid and notify provider.  Try to minimize reversal of analgesia especially in end-of-life patients  For ordered IV doses 0.1-2mg give IVP. Give each 0.4mg over 15 seconds in emergency situations. For non-emergent situations further dilute in 9mL of NS to facilitate titration of response.    Admin. Amount: 0.1-0.4 mg = 0.25-1 mL Conc: 0.4 mg/mL  Dispense Loc: [facility] ADS 5B2  Volume: 1 mL  POC: Post-procedure           2139-Med Discontinued       nitroGLYcerin (NITROSTAT) sublingual tablet 0.4 mg  Dose: 0.4 mg  Freq: EVERY 5 MIN PRN Route: SL  PRN Reason: chest pain  Start: 09/27/18 2141   Admin. Amount: 1 tablet (1 × 0.4 mg tablet)  Dispense Loc: [facility] ADS 5B2               ondansetron (ZOFRAN-ODT) ODT tab 4 mg  Dose: 4 mg  Freq: EVERY 6 HOURS PRN Route: PO  PRN Reasons: nausea,vomiting  Start: 09/27/18 2140   Admin Instructions: This is Step 1 of nausea and vomiting management.  If nausea not resolved in 15 minutes, go to Step 2 prochlorperazine (COMPAZINE). Do not push through foil backing. Peel back foil and gently remove. Place on tongue immediately. Administration with liquid unnecessary  With dry hands, peel back foil backing and gently remove tablet; do not push oral disintegrating tablet through foil backing; administer immediately on tongue and oral disintegrating tablet dissolves in seconds; then swallow with saliva; liquid not required.    Admin. Amount: 1 tablet (1 × 4 mg tablet)  Dispense Loc: [facility] ADS 5B2  POC: Post-procedure              Or  ondansetron (ZOFRAN) injection 4 mg  Dose: 4 mg  Freq: EVERY 6 HOURS PRN Route: IV  PRN Reasons: nausea,vomiting  Start: 09/27/18 2140    Admin Instructions: This is Step 1 of nausea and vomiting management.  If nausea not resolved in 15 minutes, go to Step 2 prochlorperazine (COMPAZINE).  Irritant. For ordered IV doses 0.1-4 mg, give IV Push undiluted over 2-5 minutes.    Admin. Amount: 4 mg = 2 mL Conc: 4 mg/2 mL  Dispense Loc: [facility] ADS 5B2  Infused Over: 2-5 Minutes  Volume: 2 mL  POC: Post-procedure               oxyCODONE IR (ROXICODONE) tablet 5-10 mg  Dose: 5-10 mg  Freq: EVERY 4 HOURS PRN Route: PO  PRN Reason: other  PRN Comment: pain control or improvement in physical function. Hold dose for analgesic side effects.  Start: 09/27/18 2140   Admin Instructions: Start with the lowest dose. May adjust dose by 5 mg every 4 hours as needed. Notify provider to assess for uncontrolled pain or analgesic side effects. Hold while on PCA or with regular IV opioid dosing. Maximum total is 60 mg in 24 hours.    Admin. Amount: 1-2 tablet (1-2 × 5 mg tablet)  Last Admin: 09/28/18 1143  Dispense Loc: [facility] ADS 5B2  POC: Post-procedure           1143 (5 mg)-Given           prochlorperazine (COMPAZINE) injection 5 mg  Dose: 5 mg  Freq: EVERY 6 HOURS PRN Route: IV  PRN Reasons: nausea,vomiting  Start: 09/27/18 2140   Admin Instructions: This is Step 2 of nausea and vomiting management.   If nausea not resolved in 15 minutes, give metoclopramide (REGLAN) if ordered (step 3 of nausea and vomiting management)  For ordered IV doses 0.1-10 mg, give IV Push undiluted. Each 5mg over 1 minute.    Admin. Amount: 5 mg = 1 mL Conc: 5 mg/mL  Dispense Loc: [facility] ADS 5B2  Infused Over: 1-2 Minutes  Volume: 1 mL  POC: Post-procedure              Or  prochlorperazine (COMPAZINE) tablet 5 mg  Dose: 5 mg  Freq: EVERY 6 HOURS PRN Route: PO  PRN Reasons: nausea,vomiting  Start: 09/27/18 2140   Admin Instructions: This is Step 2 of nausea and vomiting management.   If nausea not resolved in 15 minutes, give metoclopramide (REGLAN) if ordered (step 3 of nausea and vomiting  management)    Admin. Amount: 1 tablet (1 × 5 mg tablet)  Dispense Loc: [facility] ADS 5B2  POC: Post-procedure               senna-docusate (SENOKOT-S;PERICOLACE) 8.6-50 MG per tablet 1 tablet  Dose: 1 tablet  Freq: 2 TIMES DAILY Route: PO  Start: 09/27/18 2140   Admin Instructions: If no bowel movement in 24 hours, increase to 2 tablets PO.  Hold for loose stools.    Admin. Amount: 1 tablet  Dispense Loc: [facility] ADS 5B2  POC: Post-procedure                         [ ] 2000          Or  senna-docusate (SENOKOT-S;PERICOLACE) 8.6-50 MG per tablet 2 tablet  Dose: 2 tablet  Freq: 2 TIMES DAILY Route: PO  Start: 09/27/18 2140   Admin Instructions: Hold for loose stools.    Admin. Amount: 2 tablet  Last Admin: 09/28/18 0833  Dispense Loc: [facility] ADS 5B2  POC: Post-procedure          2233 (2 tablet)-Given        0833 (2 tablet)-Given       [ ] 2000           simvastatin (ZOCOR) tablet 80 mg  Dose: 80 mg  Freq: AT BEDTIME Route: PO  Start: 09/27/18 2200   Admin. Amount: 2 tablet (2 × 40 mg tablet)  Last Admin: 09/27/18 2233  Dispense Loc: [facility] ADS 5B2          2233 (80 mg)-Given        [ ] 2200           sodium chloride (PF) 0.9% PF flush 3 mL  Dose: 3 mL  Freq: EVERY 8 HOURS Route: IK  Start: 09/27/18 2145   Admin Instructions: And Q1H PRN, to lock peripheral IV dormant line.    Admin. Amount: 3 mL  Dispense Loc: [facility] Floor Stock  Volume: 3 mL  POC: Post-procedure          (2234)-Not Given        (0525)-Not Given       [ ] 1345       [ ] 2145           sodium chloride (PF) 0.9% PF flush 3 mL  Dose: 3 mL  Freq: EVERY 1 HOUR PRN Route: IK  PRN Reason: line flush  PRN Comment: for peripheral IV flush post IV meds  Start: 09/27/18 2140   Admin. Amount: 3 mL  Dispense Loc: [facility] Floor Stock  Volume: 3 mL  POC: Post-procedure               spironolactone (ALDACTONE) tablet 25 mg  Dose: 25 mg  Freq: DAILY Route: PO  Start: 09/28/18 1000   Admin. Amount: 1 tablet (1 × 25 mg tablet)  Last Admin: 09/28/18 1143  Dispense Loc:  [facility] ADS 5B2           1143 (25 mg)-Given          Future Medications  Medications 18       acetaminophen (TYLENOL) tablet 650 mg  Dose: 650 mg  Freq: EVERY 4 HOURS PRN Route: PO  PRN Reason: other  PRN Comment: multimodal surgical pain management along with NSAIDS and opioid medication as indicated based on pain control and physical function.  Start: 18 0000   Admin Instructions: May give first dose 4 hours after last scheduled dose of acetaminophen.  Maximum acetaminophen dose from all sources = 75 mg/kg/day not to exceed 4 grams/day.    Admin. Amount: 2 tablet (2 × 325 mg tablet)  Dispense Loc: [facility] ADS 5B2  POC: Post-procedure              Completed Medications  Medications 18         Dose: 20 mL  Freq: DURING SURGERY Route: INFILTRATION  Start: 1850   End: 18   Admin Instructions: Invert vial to re-suspend particles immediately prior to withdrawal from vial.  Stable for 4 hours at room temperature once removed from vial.    Admin. Amount: 20 mL  Last Admin: 18  Dispense Loc: [facility]  Administrations Remainin  Volume: 20 mL  POC: Pre-procedure          1150 (20 mL)-Given              Dose: 3 g  Freq: PRE-OP/PRE-PROCEDURE Route: IV  Indications of Use: PERIOPERATIVE PHARMACOPROPHYLAXIS  Start: 18   End: 18   Admin Instructions: Give first dose within 1 hour PRIOR to incision.    Admin. Amount: 3 g = 100 mL Conc: 3 g/100 mL  Last Admin: 18  Dispense Loc: [facility] ADS 3C  Infused Over: 30 Minutes  Administrations Remainin  Volume: 100 mL  POC: Pre-procedure          1717 (3 g)-Given        (1 g)-Given              Dose: 40 mg  Freq: PRE-OP/PRE-PROCEDURE Route: SC  Start: 1850   End: 18 1204   Admin Instructions: Verify order with Anesthesia Attending provider prior to Administering.     Admin. Amount: 40 mg = 0.4 mL Conc: 40 mg/0.4 mL  Last Admin: 18 1204  Dispense Loc: [facility] ADS 3C  Administrations Remainin  Volume: 0.4 mL  POC: Pre-procedure          1204 (40 mg)-Given              Dose: 0.5 mL  Freq: PRIOR TO DISCHARGE Route: IM  Start: 18 1000   End: 18 1143   Admin Instructions: Administer when afebrile (less than 100.4F) x 24 hours, or Prior to Discharge.  If not administering when scheduled , change the due time by following the instructions in the reference link below.  If patient refuses vaccine, chart as Vaccine Refused.    Admin. Amount: 0.5 mL  Last Admin: 18  Dispense Loc: [facility]  Administrations Remainin  Volume: 0.5 mL           1143 (0.5 mL)-Given          Discontinued Medications  Medications 18         Dose: 975 mg  Freq: ONCE Route: PO  Start: 18 1300   End: 18   Admin Instructions: Maximum acetaminophen dose from all sources = 75 mg/kg/day not to exceed 4 grams/day.    Admin. Amount: 3 tablet (3 × 325 mg tablet)  Dispense Loc: [facility] ADS 3C  Administrations Remainin  POC: Pre-procedure                 -Med Discontinued          Dose: 1 g  Freq: SEE ADMIN INSTRUCTIONS Route: IV  Indications of Use: PERIOPERATIVE PHARMACOPROPHYLAXIS  Start: 18 0950   End: 18   Admin Instructions: Give every 2 hours while patient in surgery, starting 2 hours after pre-op dose. DO NOT GIVE intra-op dose if CrCl less than 10 mL/min (on dialysis).  If CrCL less than 50 mL/min, double the time interval between doses.    Admin. Amount: 1 g  Dispense Loc: [facility] ADS 3C  Infused Over: 30 Minutes  POC: Pre-procedure          -Med Discontinued          Dose: 40 mg  Freq: EVERY 24 HOURS Route: SC  Start: 18 1700   End: 18 0948   Admin Instructions: Check to make sure start date/time is 12-24 hours post op unless documented  complication, AND no sooner than 22 hours post op if spinal anesthesia used.   Continue until discharge to home. HOLD if platelet count falls below 50% of baseline or less than 100,000/ L and notify provider.    Admin. Amount: 40 mg = 0.4 mL Conc: 40 mg/0.4 mL  Dispense Loc: [facility] ADS 5B2  Volume: 0.4 mL  POC: Post-procedure           0948-Med Discontinued         Dose: 25-50 mcg  Freq: EVERY 5 MIN PRN Route: IV  PRN Reason: other  PRN Comment: acute pain  Start: 09/27/18 1953   End: 09/27/18 2135   Admin Instructions: MAX cumulative dose = 250 mcg.  Use fentaNYL (SUBLIMAZE) initially, as a short acting agent for acute pain control. If insufficient, or a longer acting agent is needed, begin morphine or HYDROmorphone (DILAUDID) if order.  For ordered IV doses 1-100 mcg give IV Push undiluted over a minimum of 3-5 minutes.    Admin. Amount: 25-50 mcg = 0.5-1 mL Conc: 50 mcg/mL  Last Admin: 09/27/18 2102  Dispense Loc: [facility] PACU  Volume: 2 mL  POC: PACU   Current Line: Peripheral IV 09/27/18 Left Lower forearm         2040 (25 mcg)-Given       2053 (50 mcg)-Given       2102 (25 mcg)-Given       2135-Med Discontinued          Dose: 25-50 mcg  Freq: EVERY 5 MIN PRN Route: IV  PRN Reason: other  PRN Comment: acute pain while in PACU.  Start: 09/27/18 1906   End: 09/27/18 2119   Admin Instructions: MAX cumulative dose = 250 mcg. Use fentaNYL (SUBLIMAZE) initially, as a short acting agent for acute pain control. If insufficient, or a longer acting agent is needed, begin morphine or HYDROmorphone (DILAUDID) if ordered.  For ordered IV doses 1-100 mcg give IV Push undiluted over a minimum of 3-5 minutes.    Admin. Amount: 25-50 mcg = 0.5-1 mL Conc: 50 mcg/mL  Dispense Loc: [facility] ADS OR BLOCK RM  Volume: 2 mL  POC: PACU          2119-Med Discontinued          Dose: 25-50 mcg  Freq: EVERY 2 MIN PRN Route: IV  PRN Reason: other  PRN Comment: acute pain. Max cumulative dose 250 mcg.   Start: 09/27/18 0950   End:  18   Admin Instructions: Caution: may have synergistic effect when used with midazolam (VERSED). If inadequate response may repeat 25-50 mcg IV slowly Q 5 minutes PRN pain (Maximum of 200 mcg total dose in 60 minutes.) Doses can be exceeded under direct oversight of patient by provider.Only given for procedural sedation while provider present. Nurse to discontinue this medication when procedure complete.  For ordered IV doses 1-100 mcg give IV Push undiluted over a minimum of 3-5 minutes.    Admin. Amount: 25-50 mcg = 0.5-1 mL Conc: 50 mcg/mL  Last Admin: 18  Dispense Loc: [facility] ADS 3C  Volume: 2 mL  POC: Pre-procedure   Current Line: Peripheral IV 18 Right;Anterior Lower forearm         1146 (50 mcg)-Given       Med Discontinued          Dose: 0.2 mg  Freq: EVERY 1 MIN PRN Route: IV  PRN Reason: benzodiazepine reversal  Start: 18   End: 18   Admin Instructions: Give over 15 seconds. If inadequate response after 45 seconds, may repeat up to a MAX total dose of 1 mg. Continue monitoring until discharge criteria met of minimum of 2 hours.  Irritant. For ordered IV doses 0.1-1 mg, give IV Push undiluted. Administer each 0.2mg over 15 seconds.    Admin. Amount: 0.2 mg = 2 mL Conc: 0.1 mg/mL  Dispense Loc: [facility] ADS 3C  Infused Over: 1 Minutes  Volume: 5 mL  POC: Pre-procedure          Med Discontinued          Dose: 300 mg  Freq: ONCE Route: PO  Start: 18 1300   End: 18   Admin Instructions: Give ONLY ONCE in pre-op.  Confirm that patient has not already received a PRE OP dose of gabapentin (NEURONTIN).  This dose is in addition to patient's home medication dose, if any.    Admin. Amount: 1 capsule (1 × 300 mg capsule)  Dispense Loc: [facility] ADS 3C  Administrations Remainin  POC: Pre-procedure                 -Med Discontinued          Dose: 0.3-0.5 mg  Freq: EVERY 5 MIN PRN Route: IV  PRN Reason: other  PRN Comment: acute pain.   May administer if Respiratory Rate is greater than 10  Start: 18   End: 18   Admin Instructions: Max cumulative dose = 2 mg  If fentaNYL (SUBLIMAZE) is also ordered, use HYDROmorphone (DILAUDID) if pain control insufficient with fentaNYL (SUBLIMAZE) or a longer acting agent is needed.  For ordered IV doses 0.1-4 mg give IV Push undiluted. Administer each 2mg over 2-5 minutes.    Admin. Amount: 0.3-0.5 mg  Dispense Loc: [facility] PACU  POC: PACU          2119-Med Discontinued          Dose: 0.3-0.5 mg  Freq: EVERY 10 MIN PRN Route: IV  PRN Reason: other  PRN Comment: acute pain.  May administer if Respiratory Rate is greater than 10  Start: 18   End: 18   Admin Instructions: Max cumulative dose = 2 mg  If fentaNYL (SUBLIMAZE) is also ordered, use HYDROmorphone (DILAUDID) if pain control insufficient with fentaNYL (SUMBLIMAZE) or a longer acting agent is needed.  For ordered IV doses 0.1-4 mg give IV Push undiluted. Administer each 2mg over 2-5 minutes.    Admin. Amount: 0.3-0.5 mg  Dispense Loc: [facility] OR BLOCK RM  POC: PACU/Phase II          2135-Med Discontinued          Dose: 10 mg  Freq: ONCE PRN Route: IV  PRN Reason: high blood pressure  PRN Comment: for Systemic Blood Pressure greater than 160 mmHg and Heart Rate greater than 60 bpm.    Start: 18   End: 18   Admin Instructions: For PACU USE ONLY.  DC WHEN TRANSFERRED TO FLOOR.  For ordered doses up to 80 mg, give IV Push undiluted. Give each 20 mg over 2 minutes.    Admin. Amount: 10 mg = 2 mL Conc: 5 mg/mL  Dispense Loc: [facility] PACU  Infused Over: 2-8 Minutes  Administrations Remainin  Volume: 2 mL  POC: PACU          2135-Med Discontinued          Rate: 100 mL/hr   Freq: CONTINUOUS Route: IV  Start: 18   End: 18   Admin Instructions: Continue until IV catheter is weaned    Dispense Loc: [facility] Floor Stock  Volume: 1,000 mL  POC: PACU                "  Med Discontinued          Rate: 100 mL/hr   Freq: CONTINUOUS Route: IV  Start: 18   End: 18   Admin Instructions: Continue until IV catheter is weaned    Dispense Loc: [facility] Floor Stock  Volume: 1,000 mL  POC: PACU/Phase II                 Med Discontinued          Rate: 25 mL/hr   Freq: CONTINUOUS Route: IV  Start: 18   End: 18   Admin Instructions: IF patient NOT on dialysis.    Dispense Loc: [facility] Floor Stock  Volume: 1,000 mL  POC: Pre-procedure                 Med Discontinued          Freq: EVERY 1 HOUR PRN Route: Top  PRN Reason: pain  PRN Comment: with VAD insertion or accessing implanted port.  Start: 18   End: 18   Admin Instructions: Do NOT give if patient has a history of allergy to any local anesthetic or any \"nickie\" product.   Apply 30 minutes prior to VAD insertion or port access.  MAX Dose:  2.5 g (  of 5 g tube)    Dispense Loc: [facility] ADS 3C  POC: Pre-procedure          1947-Med Discontinued          Dose: 1 mL  Freq: EVERY 1 HOUR PRN Route: OTHER  PRN Comment: mild pain with VAD insertion or accessing implanted port  Start: 18   End: 18   Admin Instructions: Do NOT give if patient has a history of allergy to any local anesthetic or any \"nickie\" product. MAX dose 1 mL subcutaneous OR intradermal in divided doses.    Admin. Amount: 1 mL  Dispense Loc: [facility] ADS 3C  Volume: 2 mL  POC: Pre-procedure          1947-Med Discontinued          Dose: 12.5 mg  Freq: EVERY 15 MIN PRN Route: IV  PRN Reason: post anesthesia shivering  Start: 18   End: 18   Admin Instructions: Give IV Push undiluted. 10-40 mg over 2-3 minutes, up to 125 mg over 3-15 minutes    Admin. Amount: 12.5 mg = 0.25 mL Conc: 50 mg/mL  Last Admin: 18  Dispense Loc: [facility] ADS PACU  Administrations Remainin  Volume: 1 mL  POC: PACU/Phase II           (12.5 mg)-Given       2135-Med " Discontinued          Dose: 1-2 mg  Freq: EVERY 4 MIN PRN Route: IV  PRN Reason: sedation  Start: 09/27/18 0950   End: 09/27/18 1947   Admin Instructions: Caution: when used with opioids, may need lower doses. If inadequate response may repeat 1 mg IV slowly Q 4 minutes PRN sedation until desired response (Maximum of 5 mg total dose.) Doses can be exceeded under direct oversight of patient by provider. Nurse to discontinue this medication when procedure complete.  For ordered IV doses 0.1-2.5 mg give IV Push slowly titrated over a minimum of 2 minutes. Dilute each 1mg in 4mL of NS.    Admin. Amount: 1-2 mg = 1-2 mL Conc: 1 mg/mL  Dispense Loc: [facility] ADS 3C  Volume: 2 mL  POC: Pre-procedure          1947-Med Discontinued          Dose: 0.1-0.4 mg  Freq: EVERY 2 MIN PRN Route: IV  PRN Reason: opioid reversal  Start: 09/27/18 1907   End: 09/27/18 2135   Admin Instructions: For apnea or imminent respiratory arrest: give 0.4 mg IV undiluted Q 2 minutes PRN until desired degree of reversal is obtained, stop opioid and notify provider. Continue monitoring until discharge are criteria met for a minimum of 2 hours.  For severe sedation, decrease in respiratory depth, quality or Respiratory Rate greater than 8: give 0.1 mg IV Q 2 minutes x 3 doses, stop opioid and notify provider.  Try to minimize reversal of analgesia especially in end-of-life patients.  Continue monitoring until discharge criteria are met for a minimum of 2 hours.  For ordered IV doses 0.1-2mg give IVP. Give each 0.4mg over 15 seconds in emergency situations. For non-emergent situations further dilute in 9mL of NS to facilitate titration of response.    Admin. Amount: 0.1-0.4 mg = 0.25-1 mL Conc: 0.4 mg/mL  Dispense Loc: [facility]  Volume: 1 mL  POC: PACU/Phase II          2135-Med Discontinued          Dose: 0.1-0.4 mg  Freq: EVERY 2 MIN PRN Route: IV  PRN Reason: opioid reversal  Start: 09/27/18 0950   End: 09/27/18 1947   Admin  Instructions: For apnea or imminent respiratory arrest: give 0.4 mg IV undiluted Q 2 minutes PRN until desired degree of reversal is obtained, stop opioid and notify provider. Continue monitoring until discharge are criteria met for a minimum of 2 hours. For severe sedation, decrease in respiratory depth, quality or Respiratory Rate less than 8: give 0.1 mg IV Q 2 minutes x 3 doses, stop opioid and notify provider.  Try to minimize reversal of analgesia especially in end-of-life patients.  Continue monitoring until discharge criteria are met for a minimum of 2 hours.  For ordered IV doses 0.1-2mg give IVP. Give each 0.4mg over 15 seconds in emergency situations. For non-emergent situations further dilute in 9mL of NS to facilitate titration of response.    Admin. Amount: 0.1-0.4 mg = 0.25-1 mL Conc: 0.4 mg/mL  Dispense Loc: [facility] ADS 3C  Volume: 1 mL  POC: Pre-procedure          -Med Discontinued          Dose: 4 mg  Freq: EVERY 30 MIN PRN Route: PO  PRN Reason: nausea  Start: 18   End: 18   Admin Instructions: MAX total dose = 8 mg, including OR dosing. If not resolved in 15 minutes, then go to step 2 [prochlorperazine (COMPAZINE), if ordered].  With dry hands, peel back foil backing and gently remove tablet; do not push oral disintegrating tablet through foil backing; administer immediately on tongue and oral disintegrating tablet dissolves in seconds; then swallow with saliva; liquid not required.    Admin. Amount: 1 tablet (1 × 4 mg tablet)  Dispense Loc: [facility] ADS 3C  Administrations Remainin  POC: PACU          Med Discontinued       Or    Dose: 4 mg  Freq: EVERY 30 MIN PRN Route: IV  PRN Reason: nausea  Start: 18   End: 18   Admin Instructions: MAX total dose = 8 mg, including OR dosing. If not resolved in 15 minutes, then go to step 2 [prochlorperazine (COMPAZINE), if ordered].  Irritant. For ordered IV doses 0.1-4 mg, give IV Push undiluted over  2-5 minutes.    Admin. Amount: 4 mg = 2 mL Conc: 4 mg/2 mL  Dispense Loc: [facility] ADS PACU  Infused Over: 2-5 Minutes  Administrations Remainin  Volume: 2 mL  POC: PACU          2120-Med Discontinued          Dose: 4 mg  Freq: EVERY 30 MIN PRN Route: PO  PRN Reasons: nausea,vomiting  Start: 18   End: 18   Admin Instructions: MAX total dose = 8 mg, including OR dosing. This is step 1 of nausea and vomiting management.  If not resolved in 15 minutes, then go to step 2 [prochlorperazine (COMPAZINE) if ordered].  With dry hands, peel back foil backing and gently remove tablet; do not push oral disintegrating tablet through foil backing; administer immediately on tongue and oral disintegrating tablet dissolves in seconds; then swallow with saliva; liquid not required.    Admin. Amount: 1 tablet (1 × 4 mg tablet)  Dispense Loc: [facility]  Administrations Remainin  POC: PACU/Phase II          2135-Med Discontinued       Or    Dose: 4 mg  Freq: EVERY 30 MIN PRN Route: IV  PRN Reasons: nausea,vomiting  Start: 18   End: 18   Admin Instructions: MAX total dose = 8 mg, including OR dosing. This is step 1 of nausea and vomiting management.  If not resolved in 15 minutes, then go to step 2 [prochlorperazine (COMPAZINE) if ordered].  Irritant. For ordered IV doses 0.1-4 mg, give IV Push undiluted over 2-5 minutes.    Admin. Amount: 4 mg = 2 mL Conc: 4 mg/2 mL  Dispense Loc: [facility] ADS OR BLOCK RM  Infused Over: 2-5 Minutes  Administrations Remainin  Volume: 2 mL  POC: PACU/Phase II          2135-Med Discontinued          Dose: 5 mg  Freq: EVERY 6 HOURS PRN Route: IV  PRN Reasons: nausea,vomiting  Start: 18   End: 18   Admin Instructions: This is Step 2 of nausea and vomiting management.   If nausea not resolved in 15 minutes, give metoclopramide (REGLAN) if ordered [step 3 of nausea and vomiting management].  For ordered IV doses 0.1-10 mg,  give IV Push undiluted. Each 5mg over 1 minute.    Admin. Amount: 5 mg = 1 mL Conc: 5 mg/mL  Dispense Loc: [facility]  Infused Over: 1-2 Minutes  Volume: 1 mL  POC: PACU/Phase II          2135-Med Discontinued          Dose: 3 mL  Freq: EVERY 8 HOURS Route: IK  Start: 09/27/18 1100   End: 09/27/18 1947   Admin Instructions: And Q1H PRN, to lock peripheral IV dormant line.    Admin. Amount: 3 mL  Dispense Loc: [facility] Floor Stock  Volume: 3 mL  POC: Pre-procedure                        1947-Med Discontinued          Dose: 3 mL  Freq: EVERY 1 HOUR PRN Route: IK  PRN Reason: line flush  PRN Comment: for peripheral IV flush post IV meds  Start: 09/27/18 1046   End: 09/27/18 1947   Admin. Amount: 3 mL  Dispense Loc: [facility] Floor Stock  Volume: 3 mL  POC: Pre-procedure          1947-Med Discontinued          Freq: PRN  Start: 09/27/18 1910   End: 09/27/18 2135   Last Admin: 09/27/18 1910  POC: Intra-procedure          1910 (1,000 mL)-Given       2135-Med Discontinued          Rate: 100 mL/hr   Freq: CONTINUOUS Route: IV  Start: 09/27/18 2130   End: 09/28/18 1016   Admin Instructions: TKO when tolerating PO    Last Admin: 09/28/18 0701  Dispense Loc: [facility] Floor Stock  Volume: 1,000 mL  POC: Post-procedure   Current Line: Peripheral IV 09/27/18 Left Lower forearm         2124 ( )-New Bag        0701 ( )-New Bag       1016-Med Discontinued    Medications 09/22/18 09/23/18 09/24/18 09/25/18 09/26/18 09/27/18 09/28/18               INTERAGENCY TRANSFER FORM - NOTES (H&P, Discharge Summary, Consults, Procedures, Therapies)   9/27/2018                       UNIT [address] [place] [address] BANK: [phone]               History & Physicals      H&P by Yulissa Torrez APRN CNP at 9/11/2018  6:00 PM     Author:  Yulissa Torrez APRN CNP Service:  (none) Author Type:  Nurse Practitioner    Filed:  9/11/2018  6:07 PM Encounter Date:  9/11/2018 Status:  Signed    :  Yulissa Torrez APRN CNP (Nurse  Practitioner)             Pre-Operative H & P     CC:  Preoperative exam to assess for increased cardiopulmonary risk while undergoing surgery and anesthesia.    Date of Encounter: 9/11/2018  Primary Care Physician:  Rafia Vogel  Associated diagnosis:  panniculitis    HPI  Simin Huddleston is a 68 year old female who presents for pre-operative H & P in preparation fo[AJ1.1]r[AJ1.2] a Massive Panniculectomy with [AJ1.1] Anastacio[AJ1.2] on[AJ1.1] 9/27/2018[AJ1.2] at[AJ1.1] [facility][AJ1.2].     Simin Huddleston is a 68 year old female[AJ1.1] with[AJ1.2] ischemic cardiomyopathy, systolic CHF, hypertension, hyperlipidemia, CAD, history of smoking, allergic rhinitis, chronic lower extremity DVT, diabetes and CKD[AJ1.1] that has an extremely large pannus.  She has consulted with Dr. Chaves to request a panniculectomy surgery.  She has had recurrent panniculitis, the large pannus has significantly impaired her quality of life and she has BLE vascular changes likely somewhat related to the pannus.  She has elected to proceed with the panniculectomy as scheduled above.[AJ1.2]      History is obtained from the patient.     Past Medical History  Past Medical History:   Diagnosis Date     Acquired solitary kidney      Allergic rhinitis      CAD (coronary artery disease)      Chronic deep vein thrombosis (DVT) (H)      CKD (chronic kidney disease)      Diabetes (H)      Hx of smoking      Hyperlipidemia      Ischemic cardiomyopathy      Morbid obesity (H)      Panniculitis      Systolic CHF (H)        Past Surgical History  Past Surgical History:   Procedure Laterality Date     CHOLECYSTECTOMY       coronary stents      x 3     HERNIA REPAIR       NEPHRECTOMY Right     removed due to infection     TUBAL LIGATION         Hx of Blood transfusions/reactions:[AJ1.1] none[AJ1.3]    Hx of abnormal bleeding or anti-platelet use:[AJ1.1] aspirin[AJ1.3]    Menstrual history:[AJ1.1]  postmenopausal[AJ1.3]    Steroid use in the last year:[AJ1.1] none[AJ1.3]    Personal or FH with difficulty with Anesthesia:[AJ1.1]  none[AJ1.3]    Prior to Admission Medications  Current Outpatient Prescriptions   Medication Sig Dispense Refill     acetaminophen (TYLENOL) 325 MG tablet Take 650 mg by mouth 2 times daily       aspirin 81 MG tablet Take 81 mg by mouth daily        bisacodyl (DULCOLAX) 10 MG Suppository Place 10 mg rectally daily as needed for constipation       Carboxymethylcellulose Sod PF (REFRESH PLUS) 0.5 % SOLN ophthalmic solution Place 1 drop into both eyes 4 times daily as needed for dry eyes       carvedilol (COREG) 12.5 MG tablet Take 12.5 mg by mouth 2 times daily (with meals)        diclofenac (VOLTAREN) 1 % GEL topical gel Place 4 g onto the skin 4 times daily       diphenhydrAMINE-acetaminophen (TYLENOL PM)  MG tablet Take 1 tablet by mouth nightly as needed for sleep       Ferrous Sulfate (IRON) 325 (65 FE) MG tablet Take 325 mg by mouth daily (with breakfast)        furosemide (LASIX) 20 MG tablet Take 20 mg by mouth daily        glipiZIDE (GLUCOTROL XL) 10 MG 24 hr tablet Take 10 mg by mouth daily (with breakfast)        hydrALAZINE (APRESOLINE) 50 MG tablet Take 50 mg by mouth 2 times daily        isosorbide mononitrate (IMDUR) 30 MG 24 hr tablet Take 30 mg by mouth daily        loratadine (CLARITIN) 10 MG tablet Take 10 mg by mouth daily       magnesium hydroxide (MILK OF MAGNESIA) 400 MG/5ML suspension Take 30 mLs by mouth daily as needed for constipation       nitroGLYcerin (NITROSTAT) 0.4 MG sublingual tablet Place 0.4 mg under the tongue every 5 minutes as needed for chest pain For chest pain place 1 tablet under the tongue every 5 minutes for 3 doses. If symptoms persist 5 minutes after 1st dose call 911.       pioglitazone (ACTOS) 45 MG tablet Take 45 mg by mouth daily        Salicylic Acid-Urea (KERASAL) 5-10 % OINT Externally apply topically as needed       simvastatin  (ZOCOR) 80 MG tablet Take 80 mg by mouth At Bedtime        Skin Protectants, Misc. (EUCERIN) cream Apply topically as needed for dry skin       sodium chloride (MICHELLE 128) 5 % ophthalmic ointment Place 1 Application into both eyes At Bedtime       spironolactone (ALDACTONE) 25 MG tablet Take 25 mg by mouth daily       warfarin (COUMADIN) 4 MG tablet Take 4 mg by mouth every evening         Allergies  Allergies   Allergen Reactions     Aspirin      Other reaction(s): GI intolerance     Bitter Stop Flavor Hives     Ether      Food      Green pepper, shrimp, peanuts     Latex Hives     Metformin Hives     Povidone Iodine Hives     Seafood Hives     Adhesive Tape Rash and Hives     No Clinical Screening - See Comments Hives and Rash     IV contrast dye and bleach       Social History  Social History     Social History     Marital status: Single     Spouse name: N/A     Number of children: 1     Years of education: N/A     Occupational History     disability      Social History Main Topics     Smoking status: Former Smoker     Packs/day: 2.00     Years: 10.00     Types: Cigarettes     Quit date:      Smokeless tobacco: Never Used     Alcohol use No     Drug use: No     Sexual activity: Not on file     Other Topics Concern     Not on file     Social History Narrative       Family History  Family History   Problem Relation Age of Onset     Liver Cancer Mother      Pulmonary Embolism Father      HEART DISEASE Father      Diabetes Sister      Diabetes Brother      Diabetes Paternal Grandmother      Other - See Comments Sister       from carbon monoxide poisoning     Other - See Comments Sister       at birth     Cerebrovascular Disease Brother      HEART DISEASE Brother      pacemaker     Unknown/Adopted Maternal Half-Brother                ROS/MED HX    ENT/Pulmonary:     (+)MARIVEL risk factors snores loudly, hypertension, obese, allergic rhinitis, tobacco use, Past use 2 packs/day  , . .    Neurologic:  - neg  neurologic ROS     Cardiovascular: Comment: Ischemic cardiomyopathy    (+) hypertension--CAD, --stent,unsure  3 . Taking blood thinners : . CHF etiology: systolic Last EF: 58% date: 4/2018 . VALERA, . :  METS/Exercise Tolerance:  1 - Eating, dressing   Hematologic:     (+) History of blood clots pt is anticoagulated, Anemia, -     (-) History of Transfusion   Musculoskeletal:   (+) , , other musculoskeletal- periodic low back pain      GI/Hepatic:  - neg GI/hepatic ROS       Renal/Genitourinary:     (+) chronic renal disease, type: CRI, Pt does not require dialysis, Pt has no history of transplant, Other Renal/ Genitourinary, s/p right nephrectomy      Endo:     (+) type II DM Last HgA1c: 5.7 date: 9/11/2018 Not using insulin - not using insulin pump Normal glucose range: 100-140 not previously admitted for DM/DKA Diabetic complications: nephropathy cardiac problems, Obesity, .      Psychiatric:  - neg psychiatric ROS       Infectious Disease:         Malignancy:         Other:    (+) No chance of pregnancy no H/O Chronic Pain,             Temp: 97.8  F (36.6  C) Temp src: Oral BP: 168/69 Pulse: 65     SpO2: 97 %         0 lbs 0 oz  Data Unavailable   There is no height or weight on file to calculate BMI.       Physical Exam[AJ1.1] - exam completed in wheelchair[AJ1.2]  Constitutional: Awake, alert, cooperative, no apparent distress, and appears[AJ1.1] older than[AJ1.2] stated age.[AJ1.1] Morbidly obese[AJ1.2]  Eyes: Pupils equal, round and reactive to light, extra ocular muscles intact, sclera clear, conjunctiva normal.  HENT: Normocephalic, oral pharynx with moist mucus membranes[AJ1.1]. Edentulous.[AJ1.2]  No goiter appreciated.   Respiratory: Clear to auscultation bilaterally, no crackles or wheezing.  Cardiovascular: Regular rate and rhythm, normal S1 and S2, and no murmur noted.  Carotids +2, no bruits.[AJ1.1] Trace BLE[AJ1.2] edema. Palpable[AJ1.1] radial[AJ1.2] pulses[AJ1.1],.  Diminished pedal pulses.  BLE  vascular changes.[AJ1.2]   GI: Normal bowel sounds, soft, non-distended, non-tender, no masses palpated, no hepatosplenomegaly.   Lymph/Hematologic: No cervical lymphadenopathy and no supraclavicular lymphadenopathy.  Genitourinary:[AJ1.1]  deferred[AJ1.2]  Skin: Warm and dry.[AJ1.1]  LÓPEZ[AJ1.2] anticipated surgical site.   Musculoskeletal: Full ROM of neck. There is no redness, warmth, or swelling of the[AJ1.1] exposed[AJ1.2] joints. Gross motor strength is normal.    Neurologic: Awake, alert, oriented to name, place and time. Cranial nerves II-XII are grossly intact. Gait is[AJ1.1] not assessed.[AJ1.2]  Neuropsychiatric: Calm, cooperative. Normal affect.     Labs: (personally reviewed)[AJ1.1]  Component      Latest Ref Rng & Units 9/11/2018   Sodium      133 - 144 mmol/L 141   Potassium      3.4 - 5.3 mmol/L 6.3 (HH)   Chloride      94 - 109 mmol/L 115 (H)   Carbon Dioxide      20 - 32 mmol/L 20   Anion Gap      3 - 14 mmol/L 6   Glucose      70 - 99 mg/dL 46 (LL)   Urea Nitrogen      7 - 30 mg/dL 52 (H)   Creatinine      0.52 - 1.04 mg/dL 1.70 (H)   GFR Estimate      >60 mL/min/1.7m2 30 (L)   GFR Estimate If Black      >60 mL/min/1.7m2 36 (L)   Calcium      8.5 - 10.1 mg/dL 9.2   WBC      4.0 - 11.0 10e9/L 5.8   RBC Count      3.8 - 5.2 10e12/L 3.73 (L)   Hemoglobin      11.7 - 15.7 g/dL 10.8 (L)   Hematocrit      35.0 - 47.0 % 36.1   MCV      78 - 100 fl 97   MCH      26.5 - 33.0 pg 29.0   MCHC      31.5 - 36.5 g/dL 29.9 (L)   RDW      10.0 - 15.0 % 17.4 (H)   Platelet Count      150 - 450 10e9/L 162   Hemoglobin A1C      0 - 5.6 % 5.7 (H)           EKG  7/2018  Sinus bradycardia  Inferior infarct ,age indeterminate  Anterior infarct ,age indeterminate  Abnormal ECG  No previous ECGs available      Stress test  4/2018  CONCLUSIONS  1. No exercise tolerance evaluated.  2. Normal hemodynamic response to regadenoson.  3. No regadenoson-induced angina.  4. Normal EKG response.  5. Mildly more prominent perfusion  abnormality in inferior wall with stress.  6. Unable to evaluate wall motion due to technical difficulty.  7. Ejection fraction is normal, 58%.  8. This is an abnormal regadenoson stress MIBI study revealing worsening perfusion abnormality in inferior wall with stress suggestive of inferior wall ischemia.[AJ1.2]         Outside records reviewed from:[AJ1.1] Care Everywhere[AJ1.2]          ASSESSMENT and PLAN  Simin Huddleston is a 68 year old female scheduled for a Massive Panniculectomy on 9/27/2018 by Dr. Chaves in management of morbid obesity and recurrent paniculitis.  PAC referral for risk assessment and optimization for anesthesia with comorbid conditions of: ischemic cardiomyopathy, systolic CHF, hypertension, hyperlipidemia, CAD, history of smoking, allergic rhinitis, chronic lower extremity DVT, diabetes and CKD.     Pre-operative considerations:  1.  Cardiac:  Functional status- METS is likely 2-3.  She reports that she does walk multiple times during the day at her nursing home with the assistance of a walker. She does get some exertional SOB.  She has a history of the multiple cardiac conditions listed above.  Her last stress test documents areas of ischemia consistent with old infarct and an EF of 58% (please see scanned document).  She has history of placement of 3 coronary stents in 2009.  She is medically managed with aspirin, coreg, hydralazine, imdur and lasix.  High risk surgery with 6.6% risk of major adverse cardiac event.   2.  Pulm:  Airway feasible.  MARIVEL risk: intermediate.  She quit smoking in 1974.  She is morbidly obese with a BMI >80.  3.  GI:  Risk of PONV score = 3.  If > 2, anti-emetic intervention recommended.  4. Skin:  She has BLE skin changes consistent with likely PAD and/or venous stasis.  She notes a small left anterior lower extremity wound on that is currently covered with a dressing.    5. Heme:  She is on warfarin for BLE chronic DVTs.  Her nursing home will obtain lovenox  bridging instructions and orders from her primary care provider.  Stop aspirin 7 days prior to surgery.  +chronic anemia - hgb today stable at 10.8.  T&S ordered. Will order CBC recheck for DOS.  VTE risk = 4.5%.  6. Renal:  +CKD - creatinine today is higher than normal at 1.7 as her creatinine is typically WNL with a GFR in the 50's.  Will order DOS recheck.  7. Other:  Lab called today with critical lab results of K+ at 6.3 and glucose at 46.  Patient had already left in her medical transport back on her 4+ hour drive home.  ESTEPHANIA Fiore was unable to reach patient phone.  Call placed by ESTEPHANIA Fiore to nursing home nurse Evelyn.  Possible that results were related to hemolization, but instructed Evelyn to have BMP rechecked tonight at emergency room and contact her primary care provider if results were similar.  Patient had noted prior to leaving that her  would be bringing her to [facility] on her way out of town for a meal and the nurse noted that she was sent with 2 bag meals in the vehicle.  Will order recheck of BMP for DOS.  8. Musculoskeletal:  Uses a walker for ambulation.  Gait is very likely impaired.  Consider fall risk precuations.   9. Endo:  Diabetes is managed with actos and glipizide; hold DOS.       Patient is optimized and is acceptable candidate for the proposed procedure.  No further diagnostic evaluation is needed.     Patient also evaluated by Dr. Martinez. See recommendations below.               Yulissa Torrez DNP, RN, APRN  Preoperative Assessment Center  [facility]  Cl[facility] and [facility] Center  Phone: [phone]  Fax: [phone][AJ1.1]     Revision History        User Key Date/Time User Provider Type Action    > AJ1.2 9/11/2018  6:07 PM Yulissa Torrez APRN CNP Nurse Practitioner Sign     AJ1.3 9/11/2018  5:57 PM Yulissa Torrez APRN CNP Nurse Practitioner      AJ1.1 9/11/2018  5:55 PM Yulissa Torrez APRN CNP Nurse Practitioner                       Discharge Summaries      Discharge Summaries by Velma Mcnamara MD at 9/28/2018 10:19 AM     Author:  Velma Mcnamara MD Service:  Plastic Surgery Author Type:  Resident    Filed:  9/28/2018 11:18 AM Date of Service:  9/28/2018 10:19 AM Creation Time:  9/28/2018 10:18 AM    Status:  Attested :  Velma Mcnamara MD (Resident)    Cosigner:  AGUSTO Chaves MD at 9/28/2018 11:23 AM        Attestation signed by AGUSTO Chaves MD at 9/28/2018 11:23 AM        Attestation:  Physician Attestation   I, AGUTSO Chaves, saw this patient with the resident and agree with the resident/fellow's findings and plan of care as documented in the note.      I personally reviewed vital signs, medications, labs and imaging.    Key findings: Agree with plan.    AGUSTO Chaves MD  Date of Service (when I saw the patient): 09/28/18                               [facility] Discharge Summary    Simin Huddleston MRN: [id number]   YOB: 1950 Age: 68 year old     Date of Admission:  9/27/2018  Date of Discharge::[JN1.1]  9/28/2018[JN1.2]  Admitting Physician:  AGUSTO Chaves MD  Discharge Physician:  Velma Mcnamara MD  Primary Care Physician:         Rafia Vogel          Admission Diagnoses:   S/P panniculectomy  CHF  CAD  HTN  HLD  Chronic lower extremity DVT on warfarin  DM  CKD          Discharge Diagnosis:   Same         Procedures:[JN1.1]   9/27/2018 massive panniculectomy[JN1.3]        Non-operative procedures:[JN1.1]   None performed[JN1.3]          Consultations:[JN1.1]   PHYSICAL THERAPY ADULT IP CONSULT  OCCUPATIONAL THERAPY ADULT IP CONSULT[JN1.2]          Brief History of Illness:[JN1.1]   Simin Huddleston[JN1.4] is a[JN1.3] 68 year old[JN1.4] woman with a massive panniculus with persistent panniculitis and skin irritation. Therefore, panniculectomy was offered to alleviate these symptoms. After a discussion of the risks, benefits, and alternatives, the patient desired  to undergo surgery.[JN1.3]           Hospital Course:   The patient underwent[JN1.1] massive panniculectomy[JN1.3] on[JN1.1] 9/27/2018[JN1.3], which she tolerated well without complications.[JN1.1] Due to the late end time of her operation and the extent of dissection, she was admitted for observation overnight.[JN1.3] She was transferred to the floor for routine postoperative care and the remainder of her hospitalization was unremarkable.[JN1.1] She had had hyperkalemia during her preoperative workup, and her K on the day of surgery was wnl but ernie to 5.0 on the day of discharge. She will have a BMP rechecked on 10/1/2018 at her TCU and in 1 week to be followed up with her PCP.[JN1.3] She did have mild desaturations to the high 80s while sleeping, and will follow up with her PCP to determine if an outpatient sleep study is recommended.[JN1.5]    At the time of discharge, she was tolerating a regular diet, ambulating, voiding spontaneously without difficulty, and pain was controlled with oral pain medications. The patient was discharged home in stable and improved condition.[JN1.1] She will restart her warfarin on 10/1/2018 and continue Lovenox at prophylactic dosing until INR is therapeutic. A CBC recheck is recommended at 1 week and ordered, to be followed up with her PCP.[JN1.3]         Final Pathology Result[JN1.1]/Notable Labs[JN1.3]:[JN1.1]   Pending at time of discharge[JN1.3]      Recent Labs  Lab 09/28/18 0613 09/27/18 2241 09/27/18  1005     --  144   POTASSIUM 5.1  --  4.5   CHLORIDE 113*  --  115*   CO2 24  --  23   BUN 34*  --  37*   CR 1.32* 1.24* 1.36*   *  --  103*   FRANCIS 8.4*  --  8.9       Recent Labs  Lab 09/28/18 0613 09/27/18 2241 09/27/18  1835 09/27/18  1005   WBC 10.9  --  6.1 6.8   HGB 8.0*  --  8.1* 9.8*   * 141* 144* 158[JN1.6]          Medications Prior to Admission:[JN1.1]     Prescriptions Prior to Admission   Medication Sig Dispense Refill Last Dose      diphenhydrAMINE-acetaminophen (TYLENOL PM)  MG tablet Take 1 tablet by mouth nightly as needed for sleep   9/26/2018 at Unknown time     Skin Protectants, Misc. (EUCERIN) cream Apply topically as needed for dry skin   9/26/2018 at Unknown time     sodium chloride (MICHELLE 128) 5 % ophthalmic ointment Place 1 Application into both eyes At Bedtime   9/26/2018 at Unknown time     bisacodyl (DULCOLAX) 10 MG Suppository Place 10 mg rectally daily as needed for constipation   Unknown at Unknown time     Carboxymethylcellulose Sod PF (REFRESH PLUS) 0.5 % SOLN ophthalmic solution Place 1 drop into both eyes 4 times daily as needed for dry eyes   Unknown at Unknown time     magnesium hydroxide (MILK OF MAGNESIA) 400 MG/5ML suspension Take 30 mLs by mouth daily as needed for constipation   Taking     nitroGLYcerin (NITROSTAT) 0.4 MG sublingual tablet Place 0.4 mg under the tongue every 5 minutes as needed for chest pain For chest pain place 1 tablet under the tongue every 5 minutes for 3 doses. If symptoms persist 5 minutes after 1st dose call 911.   Unknown at Unknown time[JN1.2]            Discharge Medications:[JN1.1]     Current Discharge Medication List      START taking these medications    Details   enoxaparin (LOVENOX) 40 MG/0.4ML injection Inject 0.4 mLs (40 mg) Subcutaneous every 24 hours Continue until warfarin is therapeutic, and then stop.  Qty: 14 Syringe, Refills: 0    Associated Diagnoses: S/P panniculectomy      oxyCODONE IR (ROXICODONE) 5 MG tablet Take 0.5-1 tablets (2.5-5 mg) by mouth every 4 hours as needed for severe pain  Qty: 10 tablet, Refills: 0    Associated Diagnoses: Panniculitis      senna-docusate (SENOKOT-S;PERICOLACE) 8.6-50 MG per tablet Take 1 tablet by mouth 2 times daily While taking narcotic pain medication. Hold for loose stools.  Qty: 100 tablet    Associated Diagnoses: S/P panniculectomy         CONTINUE these medications which have CHANGED    Details   acetaminophen (TYLENOL) 325  MG tablet Take 3 tablets (975 mg) by mouth every 8 hours as needed for pain Do not take more than 4,000 mg of acetaminophen (Tylenol) daily from all sources to prevent liver damage.  Qty: 100 tablet, Refills: 1    Associated Diagnoses: S/P panniculectomy      aspirin 81 MG tablet Take 1 tablet (81 mg) by mouth daily  Qty: 30 tablet    Associated Diagnoses: Coronary artery disease involving native heart with angina pectoris, unspecified vessel or lesion type (H)      carvedilol (COREG) 12.5 MG tablet Take 1 tablet (12.5 mg) by mouth 2 times daily (with meals)  Qty: 60 tablet    Associated Diagnoses: Coronary artery disease involving native heart with angina pectoris, unspecified vessel or lesion type (H)      diclofenac (VOLTAREN) 1 % GEL topical gel Place 4 g onto the skin 4 times daily    Associated Diagnoses: Arthritis      Ferrous Sulfate (IRON) 325 (65 Fe) MG tablet Take 1 tablet by mouth daily (with breakfast)  Qty: 30 tablet    Associated Diagnoses: Iron deficiency anemia, unspecified iron deficiency anemia type      furosemide (LASIX) 20 MG tablet Take 1 tablet (20 mg) by mouth daily  Qty: 30 tablet    Associated Diagnoses: Coronary artery disease involving native heart with angina pectoris, unspecified vessel or lesion type (H)      glipiZIDE (GLUCOTROL XL) 10 MG 24 hr tablet Take 1 tablet (10 mg) by mouth daily (with breakfast)  Qty: 30 tablet    Associated Diagnoses: Type 2 diabetes mellitus with complication, without long-term current use of insulin (H)      hydrALAZINE (APRESOLINE) 50 MG tablet Take 1 tablet (50 mg) by mouth 2 times daily  Qty: 60 tablet    Associated Diagnoses: Benign essential hypertension      isosorbide mononitrate (IMDUR) 30 MG 24 hr tablet Take 1 tablet (30 mg) by mouth daily  Qty: 30 tablet    Associated Diagnoses: Coronary artery disease involving native heart with angina pectoris, unspecified vessel or lesion type (H)      loratadine (CLARITIN) 10 MG tablet Take 1 tablet (10 mg)  by mouth daily  Qty: 30 tablet    Associated Diagnoses: Seasonal allergic rhinitis, unspecified chronicity, unspecified trigger      pioglitazone (ACTOS) 45 MG tablet Take 1 tablet (45 mg) by mouth daily  Qty: 30 tablet    Associated Diagnoses: Type 2 diabetes mellitus with complication, without long-term current use of insulin (H)      Salicylic Acid-Urea (KERASAL) 5-10 % OINT Externally apply topically as needed    Associated Diagnoses: Arthritis      simvastatin (ZOCOR) 80 MG tablet Take 1 tablet (80 mg) by mouth At Bedtime  Qty: 30 tablet    Associated Diagnoses: Coronary artery disease involving native heart with angina pectoris, unspecified vessel or lesion type (H)      spironolactone (ALDACTONE) 25 MG tablet Take 1 tablet (25 mg) by mouth daily  Qty: 30 tablet    Associated Diagnoses: Coronary artery disease involving native heart with angina pectoris, unspecified vessel or lesion type (H)      warfarin (COUMADIN) 4 MG tablet Take 1 tablet (4 mg) by mouth every evening Restart 10/1/2018 after surgery  Qty: 30 tablet    Associated Diagnoses: H/O deep venous thrombosis         CONTINUE these medications which have NOT CHANGED    Details   diphenhydrAMINE-acetaminophen (TYLENOL PM)  MG tablet Take 1 tablet by mouth nightly as needed for sleep      Skin Protectants, Misc. (EUCERIN) cream Apply topically as needed for dry skin      sodium chloride (MICHELLE 128) 5 % ophthalmic ointment Place 1 Application into both eyes At Bedtime      bisacodyl (DULCOLAX) 10 MG Suppository Place 10 mg rectally daily as needed for constipation      Carboxymethylcellulose Sod PF (REFRESH PLUS) 0.5 % SOLN ophthalmic solution Place 1 drop into both eyes 4 times daily as needed for dry eyes      magnesium hydroxide (MILK OF MAGNESIA) 400 MG/5ML suspension Take 30 mLs by mouth daily as needed for constipation      nitroGLYcerin (NITROSTAT) 0.4 MG sublingual tablet Place 0.4 mg under the tongue every 5 minutes as needed for chest  pain For chest pain place 1 tablet under the tongue every 5 minutes for 3 doses. If symptoms persist 5 minutes after 1st dose call 911.[JN1.7]                  Day of Discharge Physical Exam:[JN1.1]   Temp:  [97.3  F (36.3  C)-98.9  F (37.2  C)] 98.9  F (37.2  C)  Pulse:  [73] 73  Heart Rate:  [0-76] 58  Resp:  [16-86] 18  BP: ()/(35-82) 116/38  SpO2:  [92 %-99 %] 92 %[JN1.2]    Gen: Awake, alert, NAD  Resp: NLB on RA  CV: WWP, RRR  Abd: incision c/d/i, DERICK s/s, abdominal binder in place, appropriately tender, obese  Ext: WWP, mild nonpitting edema[JN1.3]         Discharge Instructions and Follow-Up:[JN1.1]     Abdominal dressing lópez/binder     Basic metabolic panel     CBC with platelets   Last Lab Result: Hemoglobin (g/dL)      Date                     Value                09/28/2018               8.0 (L)          ----------     Basic metabolic panel     General info for SNF   Length of Stay Estimate: Short Term Care: Estimated # of Days <30  Condition at Discharge: Stable  Level of care:skilled   Rehabilitation Potential: Fair  Admission H&P remains valid and up-to-date: Yes  Recent Chemotherapy: N/A  Use Nursing Home Standing Orders: Yes     Mantoux instructions   Give two-step Mantoux (PPD) Per Facility Policy Yes     Reason for your hospital stay   Panniculectomy     Glucose monitor nursing POCT   Before meals and at bedtime     Intake and output   Every shift     Daily weights   Call Provider for weight gain of more than 2 pounds per day or 5 pounds per week.     Wound care (specify)   Site:   Abdomen  Instructions:  No special dressings needed, but may cover with gauze or ABD if drainage noted from incisions. Incisions are covered with Dermabond Prineo, which will peel off on its own over 7-14 days.     Wear abdominal binder as much as possible.     Activity - Up ad kat   No lifting > 5 pounds for 6 weeks from surgery.  Minimize bending and twisting.     Additional Discharge Instructions   Warfarin  ok to restart on 10/1/2018.  Lovenox 40mg daily to continue until INR is therapeutic, then may stop.    Incentive spirometry should be performed 10x per hour while awake for at least 1 week after surgery.     Drain care   Strip bilateral DERICK drains BID. Record output daily and send records to follow up appointments.     Follow Up and recommended labs and tests   Recommend BMP recheck in 2 days (ordered - results should be discussed with patient's primary care provider).    Follow up with primary care provider in 1 week. Recommend Hemoglobin and BMP recheck. Consider outpatient sleep study as patient desaturated during sleep while inpatient, defer to primary care provider.    Follow up with Dr. Chaves as previously scheduled.     Physical Therapy Adult Consult   Evaluate and treat as clinically indicated.    Reason:  Postop deconditioning     Occupational Therapy Adult Consult   Evaluate and treat as clinically indicated.    Reason:  Postop deconditioning     Oxygen - Nasal cannula   2 Lpm by nasal cannula to keep O2 sats 92% or greater.     Fall precautions     Pneumatic Compression Device    Bilateral calf. Remove 30 mins BID. Ok to remove when ambulating.     Advance Diet as Tolerated   Follow this diet upon discharge:   Moderate Consistent CHO Diet[JN1.5]           Home Health Care:[JN1.1]   N/A- discharged to rehab facility[JN1.3]          Discharge Disposition:[JN1.1]   Discharged to nursing home[JN1.3]      Condition at discharge:[JN1.1] Stable      - - - - - - - - - - - - - - - - - -  Velma Mcnamara MD  General Surgery PGY-3     See [facility] for on-call pager information.[JN1.3]     Revision History        User Key Date/Time User Provider Type Action    > [N/A] 9/28/2018 11:18 AM Velma Mcnamara MD Resident Sign     JN1.5 9/28/2018 11:11 AM Velma Mcnamara MD Resident Share     [N/A] 9/28/2018 11:00 AM Velma Mcnamara MD Resident Share     JN1.7 9/28/2018 10:53 AM Velma Mcnamara MD Resident Share      JN1.6 9/28/2018 10:50 AM Velma Mcnamara MD Resident      JN1.4 9/28/2018 10:47 AM Velma Mcnamara MD Resident      JN1.3 9/28/2018 10:46 AM Velma Mcnamara MD Resident      JN1.2 9/28/2018 10:19 AM Velma Mcnamara MD Resident      JN1.1 9/28/2018 10:18 AM Velma Mcnamara MD Resident                   Consult Notes     No notes of this type exist for this encounter.         Progress Notes - Physician (Notes for yesterday and today)      Progress Notes by Velma Mcnamara MD at 9/28/2018  8:00 AM     Author:  Velma Mcnamara MD Service:  Plastic Surgery Author Type:  Resident    Filed:  9/28/2018 10:18 AM Date of Service:  9/28/2018  8:00 AM Creation Time:  9/28/2018 10:12 AM    Status:  Signed :  Velma Mcnamara MD (Resident)         Plastic Surgery Progress Note  09/28/2018       Subjective:  - NESHA overnight.  - Pain well controlled on Tylenol only.  - Hasn't been OOB yet.  - Voiding on bedpan.     Objective:  Temp:  [97.3  F (36.3  C)-98.9  F (37.2  C)] 98.9  F (37.2  C)  Pulse:  [73] 73  Heart Rate:  [0-76] 58  Resp:  [16-86] 18  BP: ()/(35-82) 116/38  SpO2:  [92 %-99 %] 92 %    I/O last 3 completed shifts:  In: 1200 [I.V.:1200]  Out: 380 [Urine:300; Drains:80]      Gen: Awake, alert, NAD  Resp: NLB on RA  CV: WWP, RRR  Abd: incision c/d/i, DERICK s/s, abdominal binder in place, appropriately tender, obese  Ext: WWP, mild nonpitting edema     Labs:    Recent Labs  Lab 09/28/18  0613 09/27/18  2241 09/27/18  1835 09/27/18  1005   WBC 10.9  --  6.1 6.8   HGB 8.0*  --  8.1* 9.8*   * 141* 144* 158         Recent Labs  Lab 09/28/18  0613 09/27/18  2241 09/27/18  1005     --  144   POTASSIUM 5.1  --  4.5   CHLORIDE 113*  --  115*   CO2 24  --  23   BUN 34*  --  37*   CR 1.32* 1.24* 1.36*   *  --  103*   FRANCIS 8.4*  --  8.9        Assessment/Plan:   68 year old female with a h/o CHF (EF 58%), cardiac stents (2009) on ASA, HTN, HLD, history of smoking (quit  1970s), chronic lower extremity DVT on warfarin, diabetes and CKD who is POD#1 from massive panniculectomy for panniculitis. Doing well.    - APAP for pain control, oxycodone PRN.  - Ambulate as tolerated, abdominal binder to be worn as able.  - Lovenox ppx dosing, warfarin to start on 10/1/2018 and Lovenox to continue at ppx dosing until therapeutic INR.  - Discontinue IVFs, monitor PO intake and UOP. Mod CHO diet  - Sliding scale insulin, will continue home oral antihyperglycemics upon discharge  - Continue all other home meds  - Discharge anticipated later today to TCU.       Discussed with Dr. Chaves.  - - - - - - - - - - - - - - - - - -  Velma Mcnamara MD  General Surgery PGY-3     See [facility] for on-call pager information.[JN1.1]         Revision History        User Key Date/Time User Provider Type Action    > JN1.1 9/28/2018 10:18 AM Velma Mcnamara MD Resident Sign            Progress Notes by AGUSTO Chaves MD at 9/27/2018 12:48 PM     Author:  AGUSTO Chaves MD Service:  Plastic Surgery Author Type:  Physician    Filed:  9/27/2018 12:48 PM Date of Service:  9/27/2018 12:48 PM Creation Time:  9/27/2018 12:48 PM    Status:  Signed :  AGUSTO Chaves MD (Physician)         No change in History and Physical since the last visit.  I went over the planned procedure in detail today.  All risks, benefits and alternatives were explained in detail again.  All questions were answered.  The patient understood the plan and all that was discussed and wants to proceed with the planned procedure today.  The patient understands the team approach to care in the operating room and agrees to the procedure as such.  The patient has been cleared by medicine for this procedure and all laboratory tests are stable.[MC1.1]     Revision History        User Key Date/Time User Provider Type Action    > MC1.1 9/27/2018 12:48 PM AGUSTO Chaves MD Physician Sign                  Procedure Notes      No notes of this type exist for this encounter.      Progress Notes - Therapies (Notes from 09/25/18 through 09/28/18)     No notes of this type exist for this encounter.                                          INTERAGENCY TRANSFER FORM - LAB / IMAGING / EKG / EMG RESULTS   9/27/2018                       [facility] [address] [facility]: [phone]            Unresulted Labs (24h ago through future)    Start       Ordered    09/30/18 0600  Platelet count  (enoxaparin (LOVENOX) (Weight  kg with CrCl greater than 30 mL/min is prechecked))  EVERY THREE DAYS,   Routine     Comments:  Repeat every 3 days while on VTE prophylaxis. If no result is listed, this lab has not been done the past 365 days. LATEST LAB RESULT: Platelet Count (10e9/L)       Date                     Value                 09/27/2018               144 (L)          ----------      09/27/18 2140    09/28/18 0000  Basic metabolic panel  Routine      09/28/18 1011    09/28/18 0000  CBC with platelets  Routine     Comments:  Last Lab Result: Hemoglobin (g/dL)       Date                     Value                 09/28/2018               8.0 (L)          ----------    09/28/18 1011    09/28/18 0000  Basic metabolic panel  Routine      09/28/18 1011         Lab Results - 3 Days      Glucose by meter [[id number]] (Abnormal)  Resulted: 09/28/18 1208, Result status: Final result    Ordering provider: AGUSTO Chaves MD  09/28/18 1152 Resulting lab: POINT OF CARE TEST, GLUCOSE    Specimen Information    Type Source Collected On     09/28/18 1152          Components       Value Reference Range Flag Lab   Glucose 129 70 - 99 mg/dL H 170            Surgical pathology exam [[id number]]  Resulted: 09/28/18 0841, Result status: In process    Ordering provider: AGUSTO Chaves MD  09/27/18 1846 Resulting lab: [facility]    Specimen Information    Type Source Collected On   Tissue Abdomen 09/27/18 1842            Basic metabolic panel [[id number]] (Abnormal)   Resulted: 09/28/18 0702, Result status: Final result    Ordering provider: Rosendo Price MD  09/28/18 0001 Resulting lab: [facility]    Specimen Information    Type Source Collected On   Blood  09/28/18 0613          Components       Value Reference Range Flag Lab   Sodium 141 133 - 144 mmol/L  51   Potassium 5.1 3.4 - 5.3 mmol/L  51   Chloride 113 94 - 109 mmol/L H 51   Carbon Dioxide 24 20 - 32 mmol/L  51   Anion Gap 5 3 - 14 mmol/L  51   Glucose 146 70 - 99 mg/dL H 51   Urea Nitrogen 34 7 - 30 mg/dL H 51   Creatinine 1.32 0.52 - 1.04 mg/dL H 51   GFR Estimate 40 >60 mL/min/1.7m2 L 51   Comment:  Non  GFR Calc   GFR Estimate If Black 48 >60 mL/min/1.7m2 L 51   Comment:  African American GFR Calc   Calcium 8.4 8.5 - 10.1 mg/dL L 51            CBC with platelets [[id number]] (Abnormal)  Resulted: 09/28/18 0627, Result status: Final result    Ordering provider: Rosendo Price MD  09/28/18 0001 Resulting lab: [facility]    Specimen Information    Type Source Collected On   Blood  09/28/18 0613          Components       Value Reference Range Flag Lab   WBC 10.9 4.0 - 11.0 10e9/L  51   RBC Count 2.71 3.8 - 5.2 10e12/L L 51   Hemoglobin 8.0 11.7 - 15.7 g/dL L 51   Hematocrit 26.3 35.0 - 47.0 % L 51   MCV 97 78 - 100 fl  51   MCH 29.5 26.5 - 33.0 pg  51   MCHC 30.4 31.5 - 36.5 g/dL L 51   RDW 17.5 10.0 - 15.0 % H 51   Platelet Count 137 150 - 450 10e9/L L 51            Hemoglobin A1c [[id number]]  Resulted: 09/27/18 2346, Result status: Final result    Ordering provider: Rosendo Price MD  09/27/18 5694 Resulting lab: [facility]    Specimen Information    Type Source Collected On   Blood  09/27/18 2241          Components       Value Reference Range Flag Lab   Hemoglobin A1C 4.8 0 - 5.6 %  51   Comment:         Normal <5.7% Prediabetes 5.7-6.4%  Diabetes 6.5% or higher - adopted from ADA    consensus guidelines.              Creatinine [[id number]] (Abnormal)  Resulted: 09/27/18 2310, Result status: Final result    Ordering provider: Rosendo Price MD  09/27/18 2140 Resulting lab: [facility]    Specimen Information    Type Source Collected On   Blood  09/27/18 2241          Components       Value Reference Range Flag Lab   Creatinine 1.24 0.52 - 1.04 mg/dL H 51   GFR Estimate 43 >60 mL/min/1.7m2 L 51   Comment:  Non  GFR Calc   GFR Estimate If Black 52 >60 mL/min/1.7m2 L 51   Comment:   GFR Calc            Platelet count [[id number]] (Abnormal)  Resulted: 09/27/18 2257, Result status: Final result    Ordering provider: Rosendo Price MD  09/27/18 2140 Resulting lab: [facility]    Specimen Information    Type Source Collected On   Blood  09/27/18 2241          Components       Value Reference Range Flag Lab   Platelet Count 141 150 - 450 10e9/L L 51            Glucose by meter [[id number]] (Abnormal)  Resulted: 09/27/18 2244, Result status: Final result    Ordering provider: AGUSTO Chaves MD  09/27/18 2231 Resulting lab: POINT OF CARE TEST, GLUCOSE    Specimen Information    Type Source Collected On     09/27/18 2231          Components       Value Reference Range Flag Lab   Glucose 147 70 - 99 mg/dL H 170            CBC with platelets [[id number]] (Abnormal)  Resulted: 09/27/18 2043, Result status: Final result    Ordering provider: AGUSTO Chaves MD  09/27/18 1835 Resulting lab: [facility]    Specimen Information    Type Source Collected On     09/27/18 1835          Components       Value Reference Range Flag Lab   WBC 6.1 4.0 - 11.0 10e9/L  51   RBC Count 2.79 3.8 - 5.2 10e12/L L 51   Hemoglobin 8.1 11.7 - 15.7 g/dL L 51   Hematocrit 27.3 35.0 - 47.0 % L 51   MCV 98 78 - 100 fl  51   MCH 29.0 26.5 - 33.0 pg  51   MCHC 29.7 31.5 - 36.5 g/dL L 51    RDW 17.5 10.0 - 15.0 % H 51   Platelet Count 144 150 - 450 10e9/L L 51            Glucose by meter [[id number]] (Abnormal)  Resulted: 09/27/18 2040, Result status: Final result    Ordering provider: AGUSTO Chaves MD  09/27/18 2026 Resulting lab: POINT OF CARE TEST, GLUCOSE    Specimen Information    Type Source Collected On     09/27/18 2026          Components       Value Reference Range Flag Lab   Glucose 128 70 - 99 mg/dL H 170            Basic metabolic panel [[id number]] (Abnormal)  Resulted: 09/27/18 1033, Result status: Final result    Ordering provider: Yulissa Torrez APRN CNP  09/27/18 0950 Resulting lab: [facility]    Specimen Information    Type Source Collected On   Blood  09/27/18 1005          Components       Value Reference Range Flag Lab   Sodium 144 133 - 144 mmol/L  51   Potassium 4.5 3.4 - 5.3 mmol/L  51   Chloride 115 94 - 109 mmol/L H 51   Carbon Dioxide 23 20 - 32 mmol/L  51   Anion Gap 6 3 - 14 mmol/L  51   Glucose 103 70 - 99 mg/dL H 51   Urea Nitrogen 37 7 - 30 mg/dL H 51   Creatinine 1.36 0.52 - 1.04 mg/dL H 51   GFR Estimate 38 >60 mL/min/1.7m2 L 51   Comment:  Non  GFR Calc   GFR Estimate If Black 46 >60 mL/min/1.7m2 L 51   Comment:  African American GFR Calc   Calcium 8.9 8.5 - 10.1 mg/dL  51            INR [[id number]]  Resulted: 09/27/18 1031, Result status: Final result    Ordering provider: Yulissa Torrez APRN CNP  09/27/18 0950 Resulting lab: [facility]    Specimen Information    Type Source Collected On   Blood  09/27/18 1005          Components       Value Reference Range Flag Lab   INR 1.13 0.86 - 1.14  51            CBC with platelets [[id number]] (Abnormal)  Resulted: 09/27/18 1016, Result status: Final result    Ordering provider: Yulissa Torrez APRN CNP  09/27/18 0950 Resulting lab: [facility]    Specimen Information    Type Source  Collected On   Blood  09/27/18 1005          Components       Value Reference Range Flag Lab   WBC 6.8 4.0 - 11.0 10e9/L  51   RBC Count 3.42 3.8 - 5.2 10e12/L L 51   Hemoglobin 9.8 11.7 - 15.7 g/dL L 51   Hematocrit 33.2 35.0 - 47.0 % L 51   MCV 97 78 - 100 fl  51   MCH 28.7 26.5 - 33.0 pg  51   MCHC 29.5 31.5 - 36.5 g/dL L 51   RDW 17.5 10.0 - 15.0 % H 51   Platelet Count 158 150 - 450 10e9/L  51            Glucose by meter [[id number]] (Abnormal)  Resulted: 09/27/18 1006, Result status: Final result    Ordering provider: AGUSTO Chaves MD  09/27/18 0946 Resulting lab: POINT OF CARE TEST, GLUCOSE    Specimen Information    Type Source Collected On     09/27/18 0946          Components       Value Reference Range Flag Lab   Glucose 108 70 - 99 mg/dL H 170            LAB RESULT - HIM SCAN [[id number]]  Resulted: 09/26/18 0000, Result status: Final result    Specimen Information    Type Source Collected On     09/26/18 0000            Testing Performed By     Lab - Abbreviation Name Director Address Valid Date Range    51 - Unknown [facility] Unknown [address] [facility] [address] 12/31/14 1010 - Present    88 - Unknown [facility] Unknown Unknown 10/30/02 0000 - Present    170 - Unknown POINT OF CARE TEST, GLUCOSE Unknown Unknown 10/31/11 1114 - Present            Encounter-Level Documents:     There are no encounter-level documents.      Order-Level Documents:     There are no order-level documents.

## 2018-09-27 NOTE — LETTER
Transition Communication Hand-off for Care Transitions to Next Level of Care Provider    Name: Simin Huddleston  : 1950  MRN #: [id number]  Primary Care Provider: Rafia Vogel     Primary Clinic: [address] [address]     Reason for Hospitalization:  Panniculitis  S/P panniculectomy  Admit Date/Time: 2018  9:14 AM  Discharge Date: 18  Payor Source: Payor: MEDICARE / Plan: MEDICARE / Product Type: Medicare /     Readmission Assessment Measure (YAMINI) Risk Score/category: None      Reason for Communication Hand-off Referral: Multiple providers/specialties    Discharge Plan:       Concern for non-adherence with plan of care:   Y/N N  Discharge Needs Assessment:     Follow-up plan:  Future Appointments  Date Time Provider Department Center   10/3/2018 11:30 AM AGUSTO Chaves MD [facility]       Any outstanding tests or procedures:    Procedures     Future Labs/Procedures    Oxygen - Nasal cannula     Comments:    2 Lpm by nasal cannula to keep O2 sats 92% or greater.          Referrals     Future Labs/Procedures    Occupational Therapy Adult Consult     Comments:    Evaluate and treat as clinically indicated.    Reason:  Postop deconditioning    Physical Therapy Adult Consult     Comments:    Evaluate and treat as clinically indicated.    Reason:  Postop deconditioning        Supplies     Future Labs/Procedures    Abdominal dressing lópez/binder     Pneumatic Compression Device      Comments:    Bilateral calf. Remove 30 mins BID. Ok to remove when ambulating.          Key Recommendations:  Patient is transferring back to [facility] in [place], [place]. Contact number is [phone].    Colette Holguin    AVS/Discharge Summary is the source of truth; this is a helpful guide for improved communication of patient story

## 2018-09-27 NOTE — PROGRESS NOTES
Noted that pre-op hgb today returned at 9.8 (previous 10.8).  Dr. Chaves paged and notified and scheduled anesthesia notified by Dr. Eller.     Yulissa Torrez DNP, RN, ANP-C

## 2018-09-27 NOTE — IP AVS SNAPSHOT
` ` Patient Information     Patient Name Sex     Simin Huddleston ([id number]) Female 1950       Room Bed    5235 5235-01      Patient Demographics     Address Phone    [address]   [facility] [address] [phone] (Home) *Preferred*      Patient Ethnicity & Race     Ethnic Group Patient Race    Choose not to answer Choose not to answer      Emergency Contact(s)     Name Relation Home Work Mobile    Stephen Huddleston Brother   [phone]      Documents on File        Status Date Received Description       Documents for the Patient    Consent for Services - [facility]       External Medication Information Consent       Consent for Services/Privacy Notice - Hospital/Clinic Received 18     Privacy Notice - [place] Received 18     Care Everywhere Prospective Auth Received 18     Consent for EHR Access-Received-ESign Received 18     Consent for Services - Hospital and Clinic Received 18     HIE Auth Received 18     Consent for EHR Access Received 18     Insurance Card Received 18 mcare    Patient ID Received 18     [facility] Specified Other       Insurance Card Received 18     Patient Photo   Photo of Patient       Documents for the Encounter    CMS IM for Patient Signature Received 18       Admission Information     Attending Provider Admitting Provider Admission Type Admission Date/Time    AGUSTO Chaves MD Choudry, M Umar Hasan, MD Elective 18  0914    Discharge Date Hospital Service Auth/Cert Status Service Area     Plastic Surgery [facility]    Unit Room/Bed Admission Status       UU U5B 5235/5235-01 Admission (Confirmed)       Admission     Complaint    Panniculitis, S/P panniculectomy      Hospital Account     Name Acct ID Class Status Primary Coverage    Simin Huddleston [id number] Observation Open MEDICARE - MEDICARE            Guarantor Account (for Hospital Account #[id number])     Name Relation  to Pt Service Area Active? Acct Type    Simin Huddleston Self FCS Yes Personal/Family    Address Phone          [address] [place] [address] [phone](H)              Coverage Information (for Hospital Account #[id number])     F/O Payor/Plan Precert #    MEDICARE/MEDICARE     Subscriber Subscriber #    Simin Huddleston [id number]    Address Phone    [employer]  [address]  [place] [address] [phone]

## 2018-09-27 NOTE — ANESTHESIA PROCEDURE NOTES
Peripheral Nerve Block Procedure Note    Staff:     Anesthesiologist:  YEHUDA XIE    Resident/CRNA:  BRITNEY RING    Block performed by resident/CRNA in the presence of a teaching physician    Location: Pre-op  Procedure Start/Stop TImes:      9/27/2018 11:45 AM     9/27/2018 11:54 AM    patient identified, IV checked, site marked, risks and benefits discussed, informed consent, monitors and equipment checked, pre-op evaluation, at physician/surgeon's request and post-op pain management      Correct Patient: Yes      Correct Position: Yes      Correct Site: Yes      Correct Procedure: Yes      Correct Laterality:  Yes    Site Marked:  Yes  Procedure details:     Procedure:  TAP    ASA:  3    Laterality:  Bilateral    Position:  Supine    Sterile Prep: chloraprep, mask and sterile gloves      Needle:  Touhy needle    Needle gauge:  21    Ultrasound: Yes      Ultrasound used to identify targeted nerve, plexus, or vascular structure and placed a needle adjacent to it      Permanent Image entered into patiient's record      Abnormal pain on injection: No      Blood Aspirated: No      Paresthesias:  No    Bleeding at site: No      Test dose negative for signs of intravascular injection: Yes      Bolus via:  Needle    Infusion Method:  Single Shot    Blood aspirated via catheter: No      Complications:  None

## 2018-09-27 NOTE — OR NURSING
Pt prepped et draped in usual manner for bilateral TAP blocks.. VSS  Pt tolerated procedure very well.

## 2018-09-28 VITALS
HEIGHT: 55 IN | BODY MASS INDEX: 57.6 KG/M2 | HEART RATE: 73 BPM | TEMPERATURE: 98.9 F | SYSTOLIC BLOOD PRESSURE: 125 MMHG | RESPIRATION RATE: 18 BRPM | DIASTOLIC BLOOD PRESSURE: 47 MMHG | WEIGHT: 248.9 LBS | OXYGEN SATURATION: 93 %

## 2018-09-28 PROBLEM — Z98.890 S/P PANNICULECTOMY: Status: ACTIVE | Noted: 2018-09-28

## 2018-09-28 LAB
ANION GAP SERPL CALCULATED.3IONS-SCNC: 5 MMOL/L (ref 3–14)
BUN SERPL-MCNC: 34 MG/DL (ref 7–30)
CALCIUM SERPL-MCNC: 8.4 MG/DL (ref 8.5–10.1)
CHLORIDE SERPL-SCNC: 113 MMOL/L (ref 94–109)
CO2 SERPL-SCNC: 24 MMOL/L (ref 20–32)
CREAT SERPL-MCNC: 1.32 MG/DL (ref 0.52–1.04)
ERYTHROCYTE [DISTWIDTH] IN BLOOD BY AUTOMATED COUNT: 17.5 % (ref 10–15)
GFR SERPL CREATININE-BSD FRML MDRD: 40 ML/MIN/1.7M2
GLUCOSE BLDC GLUCOMTR-MCNC: 129 MG/DL (ref 70–99)
GLUCOSE SERPL-MCNC: 146 MG/DL (ref 70–99)
HCT VFR BLD AUTO: 26.3 % (ref 35–47)
HGB BLD-MCNC: 8 G/DL (ref 11.7–15.7)
MCH RBC QN AUTO: 29.5 PG (ref 26.5–33)
MCHC RBC AUTO-ENTMCNC: 30.4 G/DL (ref 31.5–36.5)
MCV RBC AUTO: 97 FL (ref 78–100)
PLATELET # BLD AUTO: 137 10E9/L (ref 150–450)
POTASSIUM SERPL-SCNC: 5.1 MMOL/L (ref 3.4–5.3)
RBC # BLD AUTO: 2.71 10E12/L (ref 3.8–5.2)
SODIUM SERPL-SCNC: 141 MMOL/L (ref 133–144)
WBC # BLD AUTO: 10.9 10E9/L (ref 4–11)

## 2018-09-28 PROCEDURE — 90662 IIV NO PRSV INCREASED AG IM: CPT | Performed by: PLASTIC SURGERY

## 2018-09-28 PROCEDURE — 36415 COLL VENOUS BLD VENIPUNCTURE: CPT | Performed by: STUDENT IN AN ORGANIZED HEALTH CARE EDUCATION/TRAINING PROGRAM

## 2018-09-28 PROCEDURE — G0008 ADMIN INFLUENZA VIRUS VAC: HCPCS

## 2018-09-28 PROCEDURE — A9270 NON-COVERED ITEM OR SERVICE: HCPCS | Mod: GY | Performed by: SURGERY

## 2018-09-28 PROCEDURE — 25000128 H RX IP 250 OP 636: Performed by: STUDENT IN AN ORGANIZED HEALTH CARE EDUCATION/TRAINING PROGRAM

## 2018-09-28 PROCEDURE — 25000131 ZZH RX MED GY IP 250 OP 636 PS 637: Mod: GY | Performed by: STUDENT IN AN ORGANIZED HEALTH CARE EDUCATION/TRAINING PROGRAM

## 2018-09-28 PROCEDURE — G0378 HOSPITAL OBSERVATION PER HR: HCPCS

## 2018-09-28 PROCEDURE — 25000128 H RX IP 250 OP 636: Performed by: PLASTIC SURGERY

## 2018-09-28 PROCEDURE — 25000132 ZZH RX MED GY IP 250 OP 250 PS 637: Mod: GY | Performed by: SURGERY

## 2018-09-28 PROCEDURE — 80048 BASIC METABOLIC PNL TOTAL CA: CPT | Performed by: STUDENT IN AN ORGANIZED HEALTH CARE EDUCATION/TRAINING PROGRAM

## 2018-09-28 PROCEDURE — 25000132 ZZH RX MED GY IP 250 OP 250 PS 637: Mod: GY | Performed by: STUDENT IN AN ORGANIZED HEALTH CARE EDUCATION/TRAINING PROGRAM

## 2018-09-28 PROCEDURE — 00000146 ZZHCL STATISTIC GLUCOSE BY METER IP

## 2018-09-28 PROCEDURE — A9270 NON-COVERED ITEM OR SERVICE: HCPCS | Mod: GY | Performed by: STUDENT IN AN ORGANIZED HEALTH CARE EDUCATION/TRAINING PROGRAM

## 2018-09-28 PROCEDURE — 85027 COMPLETE CBC AUTOMATED: CPT | Performed by: STUDENT IN AN ORGANIZED HEALTH CARE EDUCATION/TRAINING PROGRAM

## 2018-09-28 RX ORDER — LORATADINE 10 MG/1
10 TABLET ORAL DAILY
Qty: 30 TABLET | DISCHARGE
Start: 2018-09-28

## 2018-09-28 RX ORDER — ASPIRIN 81 MG/1
81 TABLET, CHEWABLE ORAL DAILY
Status: DISCONTINUED | OUTPATIENT
Start: 2018-09-28 | End: 2018-09-28 | Stop reason: HOSPADM

## 2018-09-28 RX ORDER — OXYCODONE HYDROCHLORIDE 5 MG/1
5-10 TABLET ORAL EVERY 4 HOURS PRN
Qty: 20 TABLET | Refills: 0 | Status: SHIPPED | OUTPATIENT
Start: 2018-09-28 | End: 2018-09-28

## 2018-09-28 RX ORDER — HYDRALAZINE HYDROCHLORIDE 50 MG/1
50 TABLET, FILM COATED ORAL 2 TIMES DAILY
Status: DISCONTINUED | OUTPATIENT
Start: 2018-09-28 | End: 2018-09-28 | Stop reason: HOSPADM

## 2018-09-28 RX ORDER — CARVEDILOL 12.5 MG/1
12.5 TABLET ORAL 2 TIMES DAILY WITH MEALS
Qty: 60 TABLET | DISCHARGE
Start: 2018-09-28

## 2018-09-28 RX ORDER — FUROSEMIDE 20 MG
20 TABLET ORAL DAILY
Status: DISCONTINUED | OUTPATIENT
Start: 2018-09-28 | End: 2018-09-28 | Stop reason: HOSPADM

## 2018-09-28 RX ORDER — HYDRALAZINE HYDROCHLORIDE 50 MG/1
50 TABLET, FILM COATED ORAL 2 TIMES DAILY
Qty: 60 TABLET | DISCHARGE
Start: 2018-09-28

## 2018-09-28 RX ORDER — PIOGLITAZONEHYDROCHLORIDE 45 MG/1
45 TABLET ORAL DAILY
Qty: 30 TABLET | DISCHARGE
Start: 2018-09-28

## 2018-09-28 RX ORDER — ISOSORBIDE MONONITRATE 30 MG/1
30 TABLET, EXTENDED RELEASE ORAL DAILY
Status: DISCONTINUED | OUTPATIENT
Start: 2018-09-28 | End: 2018-09-28 | Stop reason: HOSPADM

## 2018-09-28 RX ORDER — ISOSORBIDE MONONITRATE 30 MG/1
30 TABLET, EXTENDED RELEASE ORAL DAILY
Qty: 30 TABLET | DISCHARGE
Start: 2018-09-28

## 2018-09-28 RX ORDER — OXYCODONE HYDROCHLORIDE 5 MG/1
2.5-5 TABLET ORAL EVERY 4 HOURS PRN
Qty: 10 TABLET | Refills: 0 | Status: SHIPPED | OUTPATIENT
Start: 2018-09-28 | End: 2018-09-28

## 2018-09-28 RX ORDER — SPIRONOLACTONE 25 MG/1
25 TABLET ORAL DAILY
Status: DISCONTINUED | OUTPATIENT
Start: 2018-09-28 | End: 2018-09-28 | Stop reason: HOSPADM

## 2018-09-28 RX ORDER — PNV NO.95/FERROUS FUM/FOLIC AC 28MG-0.8MG
1 TABLET ORAL
Qty: 30 TABLET | DISCHARGE
Start: 2018-09-28

## 2018-09-28 RX ORDER — ACETAMINOPHEN 325 MG/1
975 TABLET ORAL EVERY 8 HOURS PRN
Qty: 100 TABLET | Refills: 1 | DISCHARGE
Start: 2018-09-28

## 2018-09-28 RX ORDER — OXYCODONE HYDROCHLORIDE 5 MG/1
2.5-5 TABLET ORAL EVERY 4 HOURS PRN
Qty: 10 TABLET | Refills: 0 | Status: SHIPPED | OUTPATIENT
Start: 2018-09-28

## 2018-09-28 RX ORDER — FUROSEMIDE 20 MG
20 TABLET ORAL DAILY
Qty: 30 TABLET | DISCHARGE
Start: 2018-09-28

## 2018-09-28 RX ORDER — OXYCODONE HYDROCHLORIDE 5 MG/1
5-10 TABLET ORAL EVERY 4 HOURS PRN
Qty: 10 TABLET | Refills: 0 | Status: SHIPPED | OUTPATIENT
Start: 2018-09-28 | End: 2018-09-28

## 2018-09-28 RX ORDER — AMOXICILLIN 250 MG
1 CAPSULE ORAL 2 TIMES DAILY
Qty: 100 TABLET | DISCHARGE
Start: 2018-09-28

## 2018-09-28 RX ORDER — SIMVASTATIN 80 MG
80 TABLET ORAL AT BEDTIME
Qty: 30 TABLET | DISCHARGE
Start: 2018-09-28

## 2018-09-28 RX ORDER — SPIRONOLACTONE 25 MG/1
25 TABLET ORAL DAILY
Qty: 30 TABLET | DISCHARGE
Start: 2018-09-28

## 2018-09-28 RX ORDER — WARFARIN SODIUM 4 MG/1
4 TABLET ORAL EVERY EVENING
Qty: 30 TABLET | DISCHARGE
Start: 2018-10-01

## 2018-09-28 RX ORDER — GLIPIZIDE 10 MG/1
10 TABLET, FILM COATED, EXTENDED RELEASE ORAL
Qty: 30 TABLET | DISCHARGE
Start: 2018-09-28

## 2018-09-28 RX ADMIN — ISOSORBIDE MONONITRATE 30 MG: 30 TABLET, EXTENDED RELEASE ORAL at 11:43

## 2018-09-28 RX ADMIN — ASPIRIN 81 MG CHEWABLE TABLET 81 MG: 81 TABLET CHEWABLE at 11:42

## 2018-09-28 RX ADMIN — SPIRONOLACTONE 25 MG: 25 TABLET ORAL at 11:43

## 2018-09-28 RX ADMIN — INFLUENZA A VIRUS A/MICHIGAN/45/2015 X-275 (H1N1) ANTIGEN (FORMALDEHYDE INACTIVATED), INFLUENZA A VIRUS A/SINGAPORE/INFIMH-16-0019/2016 IVR-186 (H3N2) ANTIGEN (FORMALDEHYDE INACTIVATED), AND INFLUENZA B VIRUS B/MARYLAND/15/2016 BX-69A (A B/COLORADO/6/2017-LIKE VIRUS) ANTIGEN (FORMALDEHYDE INACTIVATED) 0.5 ML: 60; 60; 60 INJECTION, SUSPENSION INTRAMUSCULAR at 11:43

## 2018-09-28 RX ADMIN — ACETAMINOPHEN 975 MG: 325 TABLET, FILM COATED ORAL at 15:12

## 2018-09-28 RX ADMIN — FUROSEMIDE 20 MG: 20 TABLET ORAL at 11:43

## 2018-09-28 RX ADMIN — OXYCODONE HYDROCHLORIDE 5 MG: 5 TABLET ORAL at 15:12

## 2018-09-28 RX ADMIN — SODIUM CHLORIDE: 9 INJECTION, SOLUTION INTRAVENOUS at 07:01

## 2018-09-28 RX ADMIN — CARVEDILOL 12.5 MG: 12.5 TABLET, FILM COATED ORAL at 08:33

## 2018-09-28 RX ADMIN — SENNOSIDES AND DOCUSATE SODIUM 2 TABLET: 8.6; 5 TABLET ORAL at 08:33

## 2018-09-28 RX ADMIN — INSULIN ASPART 1 UNITS: 100 INJECTION, SOLUTION INTRAVENOUS; SUBCUTANEOUS at 08:33

## 2018-09-28 RX ADMIN — OXYCODONE HYDROCHLORIDE 5 MG: 5 TABLET ORAL at 11:43

## 2018-09-28 RX ADMIN — LORATADINE 10 MG: 10 TABLET ORAL at 08:33

## 2018-09-28 RX ADMIN — HYDRALAZINE HYDROCHLORIDE 50 MG: 50 TABLET ORAL at 11:42

## 2018-09-28 RX ADMIN — ACETAMINOPHEN 975 MG: 325 TABLET, FILM COATED ORAL at 05:57

## 2018-09-28 NOTE — DISCHARGE SUMMARY
[facility] Discharge Summary    Simin Huddleston MRN: [id number]   YOB: 1950 Age: 68 year old     Date of Admission:  9/27/2018  Date of Discharge::  9/28/2018  Admitting Physician:  AGUSTO Chaves MD  Discharge Physician:  Velma Mcnamara MD  Primary Care Physician:         Rafia Vogel          Admission Diagnoses:   S/P panniculectomy  CHF  CAD  HTN  HLD  Chronic lower extremity DVT on warfarin  DM  CKD          Discharge Diagnosis:   Same         Procedures:   9/27/2018 massive panniculectomy        Non-operative procedures:   None performed          Consultations:   PHYSICAL THERAPY ADULT IP CONSULT  OCCUPATIONAL THERAPY ADULT IP CONSULT          Brief History of Illness:   Simin Huddleston is a 68 year old woman with a massive panniculus with persistent panniculitis and skin irritation. Therefore, panniculectomy was offered to alleviate these symptoms. After a discussion of the risks, benefits, and alternatives, the patient desired to undergo surgery.           Hospital Course:   The patient underwent massive panniculectomy on 9/27/2018, which she tolerated well without complications. Due to the late end time of her operation and the extent of dissection, she was admitted for observation overnight. She was transferred to the floor for routine postoperative care and the remainder of her hospitalization was unremarkable. She had had hyperkalemia during her preoperative workup, and her K on the day of surgery was wnl but ernie to 5.0 on the day of discharge. She will have a BMP rechecked on 10/1/2018 at her TCU and in 1 week to be followed up with her PCP. She did have mild desaturations to the high 80s while sleeping, and will follow up with her PCP to determine if an outpatient sleep study is recommended.    At the time of discharge, she was tolerating a regular diet, ambulating, voiding spontaneously without difficulty, and pain was controlled with oral pain medications. The  patient was discharged home in stable and improved condition. She will restart her warfarin on 10/1/2018 and continue Lovenox at prophylactic dosing until INR is therapeutic. A CBC recheck is recommended at 1 week and ordered, to be followed up with her PCP.         Final Pathology Result/Notable Labs:   Pending at time of discharge      Recent Labs  Lab 09/28/18  0613 09/27/18  2241 09/27/18  1005     --  144   POTASSIUM 5.1  --  4.5   CHLORIDE 113*  --  115*   CO2 24  --  23   BUN 34*  --  37*   CR 1.32* 1.24* 1.36*   *  --  103*   FRANCIS 8.4*  --  8.9       Recent Labs  Lab 09/28/18  0613 09/27/18  2241 09/27/18  1835 09/27/18  1005   WBC 10.9  --  6.1 6.8   HGB 8.0*  --  8.1* 9.8*   * 141* 144* 158          Medications Prior to Admission:     Prescriptions Prior to Admission   Medication Sig Dispense Refill Last Dose     diphenhydrAMINE-acetaminophen (TYLENOL PM)  MG tablet Take 1 tablet by mouth nightly as needed for sleep   9/26/2018 at Unknown time     Skin Protectants, Misc. (EUCERIN) cream Apply topically as needed for dry skin   9/26/2018 at Unknown time     sodium chloride (MICHELLE 128) 5 % ophthalmic ointment Place 1 Application into both eyes At Bedtime   9/26/2018 at Unknown time     bisacodyl (DULCOLAX) 10 MG Suppository Place 10 mg rectally daily as needed for constipation   Unknown at Unknown time     Carboxymethylcellulose Sod PF (REFRESH PLUS) 0.5 % SOLN ophthalmic solution Place 1 drop into both eyes 4 times daily as needed for dry eyes   Unknown at Unknown time     magnesium hydroxide (MILK OF MAGNESIA) 400 MG/5ML suspension Take 30 mLs by mouth daily as needed for constipation   Taking     nitroGLYcerin (NITROSTAT) 0.4 MG sublingual tablet Place 0.4 mg under the tongue every 5 minutes as needed for chest pain For chest pain place 1 tablet under the tongue every 5 minutes for 3 doses. If symptoms persist 5 minutes after 1st dose call 911.   Unknown at Unknown time             Discharge Medications:     Current Discharge Medication List      START taking these medications    Details   enoxaparin (LOVENOX) 40 MG/0.4ML injection Inject 0.4 mLs (40 mg) Subcutaneous every 24 hours Continue until warfarin is therapeutic, and then stop.  Qty: 14 Syringe, Refills: 0    Associated Diagnoses: S/P panniculectomy      oxyCODONE IR (ROXICODONE) 5 MG tablet Take 0.5-1 tablets (2.5-5 mg) by mouth every 4 hours as needed for severe pain  Qty: 10 tablet, Refills: 0    Associated Diagnoses: Panniculitis      senna-docusate (SENOKOT-S;PERICOLACE) 8.6-50 MG per tablet Take 1 tablet by mouth 2 times daily While taking narcotic pain medication. Hold for loose stools.  Qty: 100 tablet    Associated Diagnoses: S/P panniculectomy         CONTINUE these medications which have CHANGED    Details   acetaminophen (TYLENOL) 325 MG tablet Take 3 tablets (975 mg) by mouth every 8 hours as needed for pain Do not take more than 4,000 mg of acetaminophen (Tylenol) daily from all sources to prevent liver damage.  Qty: 100 tablet, Refills: 1    Associated Diagnoses: S/P panniculectomy      aspirin 81 MG tablet Take 1 tablet (81 mg) by mouth daily  Qty: 30 tablet    Associated Diagnoses: Coronary artery disease involving native heart with angina pectoris, unspecified vessel or lesion type (H)      carvedilol (COREG) 12.5 MG tablet Take 1 tablet (12.5 mg) by mouth 2 times daily (with meals)  Qty: 60 tablet    Associated Diagnoses: Coronary artery disease involving native heart with angina pectoris, unspecified vessel or lesion type (H)      diclofenac (VOLTAREN) 1 % GEL topical gel Place 4 g onto the skin 4 times daily    Associated Diagnoses: Arthritis      Ferrous Sulfate (IRON) 325 (65 Fe) MG tablet Take 1 tablet by mouth daily (with breakfast)  Qty: 30 tablet    Associated Diagnoses: Iron deficiency anemia, unspecified iron deficiency anemia type      furosemide (LASIX) 20 MG tablet Take 1 tablet (20 mg) by mouth  daily  Qty: 30 tablet    Associated Diagnoses: Coronary artery disease involving native heart with angina pectoris, unspecified vessel or lesion type (H)      glipiZIDE (GLUCOTROL XL) 10 MG 24 hr tablet Take 1 tablet (10 mg) by mouth daily (with breakfast)  Qty: 30 tablet    Associated Diagnoses: Type 2 diabetes mellitus with complication, without long-term current use of insulin (H)      hydrALAZINE (APRESOLINE) 50 MG tablet Take 1 tablet (50 mg) by mouth 2 times daily  Qty: 60 tablet    Associated Diagnoses: Benign essential hypertension      isosorbide mononitrate (IMDUR) 30 MG 24 hr tablet Take 1 tablet (30 mg) by mouth daily  Qty: 30 tablet    Associated Diagnoses: Coronary artery disease involving native heart with angina pectoris, unspecified vessel or lesion type (H)      loratadine (CLARITIN) 10 MG tablet Take 1 tablet (10 mg) by mouth daily  Qty: 30 tablet    Associated Diagnoses: Seasonal allergic rhinitis, unspecified chronicity, unspecified trigger      pioglitazone (ACTOS) 45 MG tablet Take 1 tablet (45 mg) by mouth daily  Qty: 30 tablet    Associated Diagnoses: Type 2 diabetes mellitus with complication, without long-term current use of insulin (H)      Salicylic Acid-Urea (KERASAL) 5-10 % OINT Externally apply topically as needed    Associated Diagnoses: Arthritis      simvastatin (ZOCOR) 80 MG tablet Take 1 tablet (80 mg) by mouth At Bedtime  Qty: 30 tablet    Associated Diagnoses: Coronary artery disease involving native heart with angina pectoris, unspecified vessel or lesion type (H)      spironolactone (ALDACTONE) 25 MG tablet Take 1 tablet (25 mg) by mouth daily  Qty: 30 tablet    Associated Diagnoses: Coronary artery disease involving native heart with angina pectoris, unspecified vessel or lesion type (H)      warfarin (COUMADIN) 4 MG tablet Take 1 tablet (4 mg) by mouth every evening Restart 10/1/2018 after surgery  Qty: 30 tablet    Associated Diagnoses: H/O deep venous thrombosis          CONTINUE these medications which have NOT CHANGED    Details   diphenhydrAMINE-acetaminophen (TYLENOL PM)  MG tablet Take 1 tablet by mouth nightly as needed for sleep      Skin Protectants, Misc. (EUCERIN) cream Apply topically as needed for dry skin      sodium chloride (MICHELLE 128) 5 % ophthalmic ointment Place 1 Application into both eyes At Bedtime      bisacodyl (DULCOLAX) 10 MG Suppository Place 10 mg rectally daily as needed for constipation      Carboxymethylcellulose Sod PF (REFRESH PLUS) 0.5 % SOLN ophthalmic solution Place 1 drop into both eyes 4 times daily as needed for dry eyes      magnesium hydroxide (MILK OF MAGNESIA) 400 MG/5ML suspension Take 30 mLs by mouth daily as needed for constipation      nitroGLYcerin (NITROSTAT) 0.4 MG sublingual tablet Place 0.4 mg under the tongue every 5 minutes as needed for chest pain For chest pain place 1 tablet under the tongue every 5 minutes for 3 doses. If symptoms persist 5 minutes after 1st dose call 911.                  Day of Discharge Physical Exam:   Temp:  [97.3  F (36.3  C)-98.9  F (37.2  C)] 98.9  F (37.2  C)  Pulse:  [73] 73  Heart Rate:  [0-76] 58  Resp:  [16-86] 18  BP: ()/(35-82) 116/38  SpO2:  [92 %-99 %] 92 %    Gen: Awake, alert, NAD  Resp: NLB on RA  CV: WWP, RRR  Abd: incision c/d/i, DERICK s/s, abdominal binder in place, appropriately tender, obese  Ext: WWP, mild nonpitting edema         Discharge Instructions and Follow-Up:     Abdominal dressing lópez/binder     Basic metabolic panel     CBC with platelets   Last Lab Result: Hemoglobin (g/dL)      Date                     Value                09/28/2018               8.0 (L)          ----------     Basic metabolic panel     General info for SNF   Length of Stay Estimate: Short Term Care: Estimated # of Days <30  Condition at Discharge: Stable  Level of care:skilled   Rehabilitation Potential: Fair  Admission H&P remains valid and up-to-date: Yes  Recent Chemotherapy: N/A  Use  Nursing Home Standing Orders: Yes     Mantoux instructions   Give two-step Mantoux (PPD) Per Facility Policy Yes     Reason for your hospital stay   Panniculectomy     Glucose monitor nursing POCT   Before meals and at bedtime     Intake and output   Every shift     Daily weights   Call Provider for weight gain of more than 2 pounds per day or 5 pounds per week.     Wound care (specify)   Site:   Abdomen  Instructions:  No special dressings needed, but may cover with gauze or ABD if drainage noted from incisions. Incisions are covered with Dermabond Prineo, which will peel off on its own over 7-14 days.     Wear abdominal binder as much as possible.     Activity - Up ad kat   No lifting > 5 pounds for 6 weeks from surgery.  Minimize bending and twisting.     Additional Discharge Instructions   Warfarin ok to restart on 10/1/2018.  Lovenox 40mg daily to continue until INR is therapeutic, then may stop.    Incentive spirometry should be performed 10x per hour while awake for at least 1 week after surgery.     Drain care   Strip bilateral DERICK drains BID. Record output daily and send records to follow up appointments.     Follow Up and recommended labs and tests   Recommend BMP recheck in 2 days (ordered - results should be discussed with patient's primary care provider).    Follow up with primary care provider in 1 week. Recommend Hemoglobin and BMP recheck. Consider outpatient sleep study as patient desaturated during sleep while inpatient, defer to primary care provider.    Follow up with Dr. Chaves as previously scheduled.     Physical Therapy Adult Consult   Evaluate and treat as clinically indicated.    Reason:  Postop deconditioning     Occupational Therapy Adult Consult   Evaluate and treat as clinically indicated.    Reason:  Postop deconditioning     Oxygen - Nasal cannula   2 Lpm by nasal cannula to keep O2 sats 92% or greater.     Fall precautions     Pneumatic Compression Device    Bilateral calf. Remove 30  mins BID. Ok to remove when ambulating.     Advance Diet as Tolerated   Follow this diet upon discharge:   Moderate Consistent CHO Diet           Home Health Care:   N/A- discharged to rehab facility          Discharge Disposition:   Discharged to nursing home      Condition at discharge: Stable      - - - - - - - - - - - - - - - - - -  Velma Mcnamara MD  General Surgery PGY-3     See [facility] for on-call pager information.

## 2018-09-28 NOTE — PROGRESS NOTES
Social Work Services Discharge Note      Patient Name:  Simin Huddleston     Anticipated Discharge Date:  9/28/18    Discharge Disposition:   TCU:  [facility]   [place], [place]  [phone], Fax: [phone]    Following MD:  Per facility assignment     Pre-Admission Screening (PAS) online form has been completed.  The Level of Care (LOC) is:  Determined  Confirmation Code is:  No PAS required for returning Pt.  Patient/caregiver informed of referral to [facility] for Pre-Admission Screening for skilled nursing facility (SNF) placement and to expect a phone call post discharge from SNF.     Additional Services/Equipment Arranged:  Transportation arranged by RNCC through Pt's insurance CM, [phone]. Transport is coming from [place] and will arrive at 2:30pm. It is a 4 hour trip back to [place]. Writer was informed after transport was group home here that Pt would need oxygen for the transport. Despite efforts, no oxygen company was found willing to provide transport tanks. RN was able to take Pt for a walk-test and she did not desat below 95%, therefore she no longer requires oxygen. Pt's insurance CM notified.     Patient / Family response to discharge plan:  Pt is agreeable to plan for return to facility.     Persons notified of above discharge plan:  Pt, team, RN, RN at [facility]    Staff Discharge Instructions:  Please fax discharge orders and signed hard scripts for any controlled substances.  Please print a packet and send with patient.     CTS Handoff completed:  YES    Medicare Notice of Rights provided to the patient/family:  YES          Colette Holguin [facility]  ICU   Pager: [phone]

## 2018-09-28 NOTE — PLAN OF CARE
Problem: Patient Care Overview  Goal: Plan of Care/Patient Progress Review  Outcome: No Change  Pt A&Ox4. VSS on 4L O2. Arrived to the unit from PACU around 2100. Pt had a Panniculectomy.  Pt denies pain says she just feels sore. Pt has abd binder over incision. 1 urine occurrence using bedpan. Night time medications given. Pt has 2 PIVs. One is running NS 100mL/hr. Skin very bruised. Will continue to monitor and follow POC.

## 2018-09-28 NOTE — PROGRESS NOTES
Report given to [facility]. Transport met patient at front door. Patient sent with discharge packet, oxycodone signed script, all belongings (including walker and purse).

## 2018-09-28 NOTE — PROVIDER NOTIFICATION
Plastic Surgery team paged: Pt is observation status but does not have any observation goals ordered.

## 2018-09-28 NOTE — PROGRESS NOTES
"9/28/2018 RNMARLYN received a message from bedside RN that a coordinator had called her and requested us to return call so that she could arrange discontinue transport for this pt returning back to [facility].  RNCC contacted the coordinator and asked her to go ahead and arrange discontinue transportation, Maged returned call and pt ride is set for between 230-3pm as they are coming from [facility], maged can be contacted at [phone].  RNCC spoke with pt, she is aware of discharge today back to her NH and will update her brother.  She agreed with transport via car and is requesting oral pain medications prior to discharge.  Pt has a 4 hour ride to her NH, RN to please provide pain medications prior to discharge if pain is anticipated.  RNCC updated SW on transport time, SW is following up with NH regarding discharge.  Per plastics team they are printing narcotics and will sign the script for the NH.  RNCC updated bedside RN that he will need to ensure that this script is sent with pt at time of discharge.      Gayatri Guzman, LEXA, BSN    [facility]    Medicine Group  [address] [place] [address]    [email or web address]  [email or web address]    Office: [phone] Pager: [phone]  To contact weekend RNMARLYN, dial [phone] and enter pager number [phone] at prompt. This pager can not be contacted by text page or outside line.   "

## 2018-09-28 NOTE — PROGRESS NOTES
Nurse to nurse report given to Evelyn BEST at [facility]. No further questions. Awaiting patient's transportation to NH.

## 2018-09-28 NOTE — PROGRESS NOTES
Plastic Surgery Progress Note  09/28/2018       Subjective:  - NESHA overnight.  - Pain well controlled on Tylenol only.  - Hasn't been OOB yet.  - Voiding on bedpan.     Objective:  Temp:  [97.3  F (36.3  C)-98.9  F (37.2  C)] 98.9  F (37.2  C)  Pulse:  [73] 73  Heart Rate:  [0-76] 58  Resp:  [16-86] 18  BP: ()/(35-82) 116/38  SpO2:  [92 %-99 %] 92 %    I/O last 3 completed shifts:  In: 1200 [I.V.:1200]  Out: 380 [Urine:300; Drains:80]      Gen: Awake, alert, NAD  Resp: NLB on RA  CV: WWP, RRR  Abd: incision c/d/i, DERICK s/s, abdominal binder in place, appropriately tender, obese  Ext: WWP, mild nonpitting edema     Labs:    Recent Labs  Lab 09/28/18  0613 09/27/18 2241 09/27/18  1835 09/27/18  1005   WBC 10.9  --  6.1 6.8   HGB 8.0*  --  8.1* 9.8*   * 141* 144* 158         Recent Labs  Lab 09/28/18  0613 09/27/18  2241 09/27/18  1005     --  144   POTASSIUM 5.1  --  4.5   CHLORIDE 113*  --  115*   CO2 24  --  23   BUN 34*  --  37*   CR 1.32* 1.24* 1.36*   *  --  103*   FRANCIS 8.4*  --  8.9        Assessment/Plan:   68 year old female with a h/o CHF (EF 58%), cardiac stents (2009) on ASA, HTN, HLD, history of smoking (quit 1970s), chronic lower extremity DVT on warfarin, diabetes and CKD who is POD#1 from massive panniculectomy for panniculitis. Doing well.    - APAP for pain control, oxycodone PRN.  - Ambulate as tolerated, abdominal binder to be worn as able.  - Lovenox ppx dosing, warfarin to start on 10/1/2018 and Lovenox to continue at ppx dosing until therapeutic INR.  - Discontinue IVFs, monitor PO intake and UOP. Mod CHO diet  - Sliding scale insulin, will continue home oral antihyperglycemics upon discharge  - Continue all other home meds  - Discharge anticipated later today to TCU.       Discussed with Dr. Chaves.  - - - - - - - - - - - - - - - - - -  Velma Mcnamara MD  General Surgery PGY-3     See [facility] for on-call pager information.

## 2018-09-28 NOTE — PLAN OF CARE
Problem: Patient Care Overview  Goal: Plan of Care/Patient Progress Review  Outcome: No Change  VS stable on 3L O2 via NC. Capno on. Pain managed with scheduled tylenol. Tolerating PO intake, no nausea reported. Voiding well. Will continue to monitor and notify MD of any updates.

## 2018-09-28 NOTE — OP NOTE
Procedure Date: 09/27/2018      PREOPERATIVE DIAGNOSIS:  Massive abdominal wall panniculus with edema.      POSTOPERATIVE DIAGNOSIS:  Massive abdominal wall panniculitis with edema.      PROCEDURES:  Massive panniculectomy (This was a larger than usual abdominal panniculus that took longer than usual, required much more effort).      SURGEON:  Enriqueta Chaves MD      RESIDENTS:    1.  Aniya Man MD   2.  Rosendo Price MD      ANESTHESIA:  General anesthesia with endotracheal intubation.      COMPLICATIONS:  Nil.      DRAINS:  Two 15-Latv[place] Marty drains.      BLOOD LOSS:  150 mL      SPECIMENS:  Abdominal wall panniculus, about 35 pounds.      DESCRIPTION OF PROCEDURE:  After informed consent was taken, the patient was brought to the operating room and was appropriately marked in the operating room.  She was placed in a supine position with the knees comfortably flexed.  She was sterilely prepped, draped, and given general endotracheal anesthesia.  Preoperative antibiotics were given in the OR.  Preoperative Lovenox was given to the patient.  General anesthesia was administered without any complications.  She was prepped and draped in the standard surgical fashion.  She had an extremely large abdominal panniculus, with a lot of edema in the dependent areas and the panniculus hung down to her knees.  We first of all elevated the panniculus and made a lower marking in an inverted V-shape over the mons area.  We then made our incision and dissected using electrocautery.  Careful dissection was carried out throughout the subcutaneous tissues.  There was a lot of edema and there were a lot of large vessels that we came across that were clip ligated to ensure hemostasis.  This took a lot longer than usual and due to the large size of the panniculus and the thickness through which we had to go through and the number of vessels and the amount of edema.  Dissection was carried out all the way down in tangential fashion  to the abdominal wall.  We then elevated the flap in a cephalad direction and then turned our attention to the superior incision.  The superior incision was made inferior to the abdominal umbilicus and then dissection carried out in a similar manner through the edematous tissues and thick tissues, down to meet the inferior incision in a wedge like fashion.  The entire panniculus was then removed.  Again, strict hemostasis was ensured.  Two 15-Lao Marty drains were placed and the entire incision was closed with number 1 PDS suture in a deep layer, followed by staples and PRINEO.  The patient tolerated the procedure well.  All counts were correct at the end of the case.  The patient was extubated and sent to the recovery room in a stable condition.         AGUSTO MONTILLA MD             D: 2018   T: 2018   MT: DARWIN      Name:     DOMINIC BUTT   MRN:      [id number]        Account:        [id number]   :      1950           Procedure Date: 2018      Document: [id number]

## 2018-09-28 NOTE — BRIEF OP NOTE
[facility], [place]    Brief Operative Note    Pre-operative diagnosis: Panniculitis  Post-operative diagnosis Same  Procedure: Procedure(s):  Massive Panniculectomy - Wound Class: I-Clean  Surgeon: Surgeon(s) and Role:     * AGUSTO Chaves MD - Primary     * Rosendo Price MD - Resident - Assisting     * Aniya Man MD - Resident - Assisting  Anesthesia: Combined General with Block   Estimated blood loss: 200ml  Drains: Yaakov-Rowley  Specimens:   ID Type Source Tests Collected by Time Destination   A : abdominal pinniculus Tissue Abdomen SURGICAL PATHOLOGY EXAM AGUSTO Chaves MD 9/27/2018  6:42 PM      Findings:   massive panniculectomy.  Complications: None.  Implants: None.

## 2018-09-28 NOTE — ANESTHESIA POSTPROCEDURE EVALUATION
Patient: Simin Huddleston    Procedure(s):  Massive Panniculectomy - Wound Class: I-Clean    Diagnosis:Panniculitis  Diagnosis Additional Information: No value filed.    Anesthesia Type:  General, ETT    Note:  Anesthesia Post Evaluation    Patient location during evaluation: PACU  Patient participation: Able to fully participate in evaluation  Level of consciousness: awake and alert  Pain management: adequate  Airway patency: patent  Cardiovascular status: acceptable  Respiratory status: acceptable  Hydration status: acceptable  PONV: none     Anesthetic complications: None          Last vitals:  Vitals:    09/27/18 1530 09/27/18 1949 09/27/18 2000   BP:  157/61 169/67   Resp: 24 20 25   Temp:  36.5  C (97.7  F)    SpO2: 98% 99% 96%         Electronically Signed By: Sudheer Mathews MD  September 27, 2018  8:13 PM

## 2018-09-28 NOTE — PROGRESS NOTES
Patient has been assessed for Home Oxygen needs. Oxygen readings:    *Pulse oximetry (SpO2) = 97% on room air at rest while awake.    *SpO2 improved to 97% on 0 liters/minute at rest. Patient tolerating room air at rest.     *SpO2 = 95% on room air during activity/with exercise.    *SpO2 improved to 95% on 0 liters/minute during activity/with exercise. Patient tolerating room air with activity.

## 2018-09-28 NOTE — PLAN OF CARE
Problem: Patient Care Overview  Goal: Plan of Care/Patient Progress Review  Outcome: No Change  Pt resting comfortably, intermittently sleeping. Denies pain. Adequate fluid intake, denies nausea. Will continue to monitor.

## 2018-10-01 ENCOUNTER — CARE COORDINATION (OUTPATIENT)
Dept: PLASTIC SURGERY | Facility: CLINIC | Age: 68
End: 2018-10-01

## 2018-10-01 NOTE — PROGRESS NOTES
RN Post Op Care Coordination Note    POST-OP CALL     Patient is s/p panniculectomy    Lex RN at [facility], reports doing well.      Fevers/chills: patient denies fever/chills.  Eating/drinking: is able to eat and drink without any complaints.   Bowel habits: Patient reports having a normal bowel movement.  Urine output: Voiding without difficulty.   Drains (DERICK): Site covered with gauze, denies drainage from around site.  Incisions: Denies any signs and symptoms of infection. No erythema, swelling or drainage  Pain: Reports pain is controlled --10/10 prior to oxycodone, 3/10 after narcotic administration  Rehab facility requesting follow up with their wound nurse practitioner on 10/4 instead of bringing patient here (4 hours away) who will report progress to [facility].

## 2018-10-02 LAB — COPATH REPORT: NORMAL

## (undated) DEVICE — CLIP APPLIER 11" MED LIGACLIP MCM30

## (undated) DEVICE — SPONGE LAP 18X18" X8435

## (undated) DEVICE — DRAPE IOBAN INCISE 23X17" 6650EZ

## (undated) DEVICE — LINEN TOWEL PACK X6 WHITE 5487

## (undated) DEVICE — TUBING SMOKE EVAC ATTACHMENT E3590

## (undated) DEVICE — SUCTION MANIFOLD DORNOCH ULTRA CART UL-CL500

## (undated) DEVICE — LINEN TOWEL PACK X30 5481

## (undated) DEVICE — ESU GROUND PAD ADULT W/CORD E7507

## (undated) DEVICE — DRAIN JACKSON PRATT RESERVOIR 100ML SU130-1305

## (undated) DEVICE — SU ETHILON 3-0 PS-1 18" 1663H

## (undated) DEVICE — SU PDS II 1 CTX 36" Z371T

## (undated) DEVICE — DRAPE SHEET MED 44X70" 9355

## (undated) DEVICE — SU DERMABOND PRINEO CLR602US

## (undated) DEVICE — DRAIN JACKSON PRATT CHANNEL 15FR ROUND HUBLESS SIL JP-2228

## (undated) DEVICE — SUCTION TIP YANKAUER STR K87

## (undated) DEVICE — SU VICRYL 2-0 TIE 12X18" J905T

## (undated) DEVICE — SOL WATER IRRIG 1000ML BOTTLE 2F7114

## (undated) DEVICE — ESU PENCIL W/SMOKE EVAC NEPTUNE STRYKER 0703-046-000

## (undated) DEVICE — SU MONOCRYL 2-0 SH 27" UND Y417H

## (undated) DEVICE — SOL NACL 0.9% IRRIG 1000ML BOTTLE 2F7124

## (undated) DEVICE — Device

## (undated) DEVICE — DRAPE U SPLIT 74X120" 29440

## (undated) DEVICE — DRSG KERLIX 4 1/2"X4YDS ROLL 6715

## (undated) DEVICE — CLIP APPLIER 13" LG LIGACLIP MCL20

## (undated) DEVICE — PREP CHLORAPREP 26ML TINTED ORANGE  260815

## (undated) DEVICE — GLOVE PROTEXIS W/NEU-THERA 7.0  2D73TE70

## (undated) DEVICE — STPL SKIN 35W ROTATING HEAD PRW35

## (undated) RX ORDER — MEPERIDINE HYDROCHLORIDE 50 MG/ML
INJECTION INTRAMUSCULAR; INTRAVENOUS; SUBCUTANEOUS
Status: DISPENSED
Start: 2018-09-27

## (undated) RX ORDER — CEFAZOLIN SODIUM 1 G/50ML
SOLUTION INTRAVENOUS
Status: DISPENSED
Start: 2018-09-27

## (undated) RX ORDER — FENTANYL CITRATE 50 UG/ML
INJECTION, SOLUTION INTRAMUSCULAR; INTRAVENOUS
Status: DISPENSED
Start: 2018-09-27

## (undated) RX ORDER — LABETALOL HYDROCHLORIDE 5 MG/ML
INJECTION, SOLUTION INTRAVENOUS
Status: DISPENSED
Start: 2018-09-27

## (undated) RX ORDER — HYDROMORPHONE HYDROCHLORIDE 1 MG/ML
INJECTION, SOLUTION INTRAMUSCULAR; INTRAVENOUS; SUBCUTANEOUS
Status: DISPENSED
Start: 2018-09-27

## (undated) RX ORDER — SODIUM CHLORIDE 9 MG/ML
INJECTION, SOLUTION INTRAVENOUS
Status: DISPENSED
Start: 2018-09-27

## (undated) RX ORDER — BUPIVACAINE HYDROCHLORIDE 5 MG/ML
INJECTION, SOLUTION EPIDURAL; INTRACAUDAL
Status: DISPENSED
Start: 2018-09-27

## (undated) RX ORDER — CEFAZOLIN SODIUM 1 G/3ML
INJECTION, POWDER, FOR SOLUTION INTRAMUSCULAR; INTRAVENOUS
Status: DISPENSED
Start: 2018-09-27